# Patient Record
Sex: MALE | Race: WHITE | HISPANIC OR LATINO
[De-identification: names, ages, dates, MRNs, and addresses within clinical notes are randomized per-mention and may not be internally consistent; named-entity substitution may affect disease eponyms.]

---
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
## 2018-02-07 ENCOUNTER — APPOINTMENT (OUTPATIENT)
Dept: OPHTHALMOLOGY | Facility: CLINIC | Age: 18
End: 2018-02-07

## 2021-02-12 ENCOUNTER — EMERGENCY (EMERGENCY)
Facility: HOSPITAL | Age: 21
LOS: 1 days | Discharge: HOME | End: 2021-02-12
Attending: EMERGENCY MEDICINE
Payer: MEDICAID

## 2021-02-12 VITALS
HEART RATE: 82 BPM | SYSTOLIC BLOOD PRESSURE: 127 MMHG | RESPIRATION RATE: 16 BRPM | DIASTOLIC BLOOD PRESSURE: 67 MMHG | TEMPERATURE: 98 F | OXYGEN SATURATION: 98 % | HEIGHT: 67 IN | WEIGHT: 145.06 LBS

## 2021-02-12 PROCEDURE — 73130 X-RAY EXAM OF HAND: CPT

## 2021-02-12 PROCEDURE — 12002 RPR S/N/AX/GEN/TRNK2.6-7.5CM: CPT

## 2021-02-12 PROCEDURE — 99283 EMERGENCY DEPT VISIT LOW MDM: CPT | Mod: 25

## 2021-02-12 PROCEDURE — 73130 X-RAY EXAM OF HAND: CPT | Mod: 26,LT

## 2021-02-12 NOTE — ED PROVIDER NOTE - PATIENT PORTAL LINK FT
You can access the FollowMyHealth Patient Portal offered by [facility] by registering at the following website: [email or web address]. By joining [employer]’s FollowMyHealth portal, you will also be able to view your health information using other applications (apps) compatible with our system.

## 2021-02-12 NOTE — ED PROVIDER NOTE - ATTENDING CONTRIBUTION TO CARE
no retained FB on xray or exam, pt NV intact, no obvious tendon damage. lac repaired, safe for d/c with supportive care, wound care instructions.

## 2021-02-12 NOTE — ED ADULT NURSE NOTE - OBJECTIVE STATEMENT
Pt is a AAOx3, 20y male c/o left hand laceration. Pt states "I was cleaning a wine glass and it broke and cut my hand". Pt is a AAOx3, 20y male c/o left hand laceration on palm. Pt states "I was cleaning a wine glass and it broke and cut my hand". No active bleeding noted. Dry blood around site. Pt able to move all digits without difficulty. No c/o numbness or tingling. Denies discomfort at this time. Denies cp, sob, n/v, diarrhea, weakness or fevers.

## 2021-02-12 NOTE — ED PROVIDER NOTE - PHYSICAL EXAMINATION
Physical Examination: Gen: NAD, non-toxic, conversational  HENT: Normocephalic, atraumatic. External ears normal, no rhinorrhea, moist mucous membranes.   Resp: non-labored, speaking without difficulty on room air  Abd: soft, non-tender, non rigid, no guarding or rebound tenderness  Back: No CVAT bilaterally, no midline ttp  Skin: . +1cm L thenar lac and 1cm dorsal base L thumb lac  Neuro: AOx3, speech is fluent and appropriate, HUDSON without laterality, stable gait   Psych: Mood okay, affect euthymic

## 2021-02-12 NOTE — ED PROVIDER NOTE - NSFOLLOWUPINSTRUCTIONS_ED_ALL_ED_FT
1. Keep the wound clean and dry for 24 hours then gently rinse the wound daily without scrubbing.  Apply neosporin or bacitracin 1-2 times daily.  Change the dressing daily.  2. Return to the ED with new or worsening symptoms including fever, pus, redness, or uncontrolled pain.  3. Please return to Emergency Room, Urgent Care or your PMD in 5-7 days to REMOVE SUTURES.    After having your stitches or staples removed it is typical to have minor discomfort, swelling, or redness in the area. The wound is still healing so continue to protect it from injury. Keep the wound dry and if given creams, ointments, or medication, take as instructed to by your health care professional.    SEEK IMMEDIATE MEDICAL CARE IF YOU HAVE ANY OF THE FOLLOWING SYMPTOMS: increasing redness/swelling/pain in the wound, pus coming from the wound, bad smell coming from the wound, or fever.

## 2021-02-12 NOTE — ED PROVIDER NOTE - OBJECTIVE STATEMENT
20M here for glass breaking and lacerating the base of the L thumb with minimal active bleeding. last tet ~4 years. does not think any piece broke off and retained into skin.

## 2021-07-06 ENCOUNTER — EMERGENCY (EMERGENCY)
Facility: HOSPITAL | Age: 21
LOS: 1 days | Discharge: HOME | End: 2021-07-06
Attending: EMERGENCY MEDICINE | Admitting: EMERGENCY MEDICINE
Payer: MEDICAID

## 2021-07-06 VITALS
DIASTOLIC BLOOD PRESSURE: 80 MMHG | TEMPERATURE: 100 F | WEIGHT: 149.91 LBS | OXYGEN SATURATION: 96 % | RESPIRATION RATE: 16 BRPM | HEART RATE: 108 BPM | SYSTOLIC BLOOD PRESSURE: 129 MMHG | HEIGHT: 67 IN

## 2021-07-06 DIAGNOSIS — J36 PERITONSILLAR ABSCESS: ICD-10-CM

## 2021-07-06 LAB
ALBUMIN SERPL ELPH-MCNC: 3.9 G/DL — SIGNIFICANT CHANGE UP (ref 3.3–5)
ALP SERPL-CCNC: 110 U/L — SIGNIFICANT CHANGE UP (ref 40–120)
ALT FLD-CCNC: 21 U/L — SIGNIFICANT CHANGE UP (ref 10–45)
ANION GAP SERPL CALC-SCNC: 6 MMOL/L — SIGNIFICANT CHANGE UP (ref 5–17)
AST SERPL-CCNC: 22 U/L — SIGNIFICANT CHANGE UP (ref 10–40)
BASOPHILS # BLD AUTO: 0.04 K/UL — SIGNIFICANT CHANGE UP (ref 0–0.2)
BASOPHILS NFR BLD AUTO: 0.3 % — SIGNIFICANT CHANGE UP (ref 0–2)
BILIRUB SERPL-MCNC: 1.8 MG/DL — HIGH (ref 0.2–1.2)
BUN SERPL-MCNC: 11 MG/DL — SIGNIFICANT CHANGE UP (ref 7–23)
CALCIUM SERPL-MCNC: 9.1 MG/DL — SIGNIFICANT CHANGE UP (ref 8.4–10.5)
CHLORIDE SERPL-SCNC: 100 MMOL/L — SIGNIFICANT CHANGE UP (ref 96–108)
CO2 SERPL-SCNC: 30 MMOL/L — SIGNIFICANT CHANGE UP (ref 22–31)
CREAT SERPL-MCNC: 1.26 MG/DL — SIGNIFICANT CHANGE UP (ref 0.5–1.3)
EOSINOPHIL # BLD AUTO: 0.37 K/UL — SIGNIFICANT CHANGE UP (ref 0–0.5)
EOSINOPHIL NFR BLD AUTO: 2.8 % — SIGNIFICANT CHANGE UP (ref 0–6)
GLUCOSE SERPL-MCNC: 119 MG/DL — HIGH (ref 70–99)
HCT VFR BLD CALC: 45.6 % — SIGNIFICANT CHANGE UP (ref 39–50)
HGB BLD-MCNC: 16.1 G/DL — SIGNIFICANT CHANGE UP (ref 13–17)
IMM GRANULOCYTES NFR BLD AUTO: 0.2 % — SIGNIFICANT CHANGE UP (ref 0–1.5)
LYMPHOCYTES # BLD AUTO: 15 % — SIGNIFICANT CHANGE UP (ref 13–44)
LYMPHOCYTES # BLD AUTO: 2 K/UL — SIGNIFICANT CHANGE UP (ref 1–3.3)
MCHC RBC-ENTMCNC: 30.8 PG — SIGNIFICANT CHANGE UP (ref 27–34)
MCHC RBC-ENTMCNC: 35.3 GM/DL — SIGNIFICANT CHANGE UP (ref 32–36)
MCV RBC AUTO: 87.4 FL — SIGNIFICANT CHANGE UP (ref 80–100)
MONOCYTES # BLD AUTO: 1.22 K/UL — HIGH (ref 0–0.9)
MONOCYTES NFR BLD AUTO: 9.2 % — SIGNIFICANT CHANGE UP (ref 2–14)
NEUTROPHILS # BLD AUTO: 9.67 K/UL — HIGH (ref 1.8–7.4)
NEUTROPHILS NFR BLD AUTO: 72.5 % — SIGNIFICANT CHANGE UP (ref 43–77)
NRBC # BLD: 0 /100 WBCS — SIGNIFICANT CHANGE UP (ref 0–0)
PLATELET # BLD AUTO: 229 K/UL — SIGNIFICANT CHANGE UP (ref 150–400)
POTASSIUM SERPL-MCNC: 4.1 MMOL/L — SIGNIFICANT CHANGE UP (ref 3.5–5.3)
POTASSIUM SERPL-SCNC: 4.1 MMOL/L — SIGNIFICANT CHANGE UP (ref 3.5–5.3)
PROT SERPL-MCNC: 7.6 G/DL — SIGNIFICANT CHANGE UP (ref 6–8.3)
RBC # BLD: 5.22 M/UL — SIGNIFICANT CHANGE UP (ref 4.2–5.8)
RBC # FLD: 12.5 % — SIGNIFICANT CHANGE UP (ref 10.3–14.5)
SARS-COV-2 RNA SPEC QL NAA+PROBE: SIGNIFICANT CHANGE UP
SODIUM SERPL-SCNC: 136 MMOL/L — SIGNIFICANT CHANGE UP (ref 135–145)
WBC # BLD: 13.33 K/UL — HIGH (ref 3.8–10.5)
WBC # FLD AUTO: 13.33 K/UL — HIGH (ref 3.8–10.5)

## 2021-07-06 PROCEDURE — 96374 THER/PROPH/DIAG INJ IV PUSH: CPT

## 2021-07-06 PROCEDURE — 31575 DIAGNOSTIC LARYNGOSCOPY: CPT

## 2021-07-06 PROCEDURE — 99285 EMERGENCY DEPT VISIT HI MDM: CPT

## 2021-07-06 PROCEDURE — 70491 CT SOFT TISSUE NECK W/DYE: CPT | Mod: MA

## 2021-07-06 PROCEDURE — 99283 EMERGENCY DEPT VISIT LOW MDM: CPT | Mod: 25

## 2021-07-06 PROCEDURE — 87635 SARS-COV-2 COVID-19 AMP PRB: CPT

## 2021-07-06 PROCEDURE — 85025 COMPLETE CBC W/AUTO DIFF WBC: CPT

## 2021-07-06 PROCEDURE — 42700 I&D ABSCESS PERITONSILLAR: CPT

## 2021-07-06 PROCEDURE — 80053 COMPREHEN METABOLIC PANEL: CPT

## 2021-07-06 PROCEDURE — 99284 EMERGENCY DEPT VISIT MOD MDM: CPT | Mod: 25

## 2021-07-06 PROCEDURE — 36415 COLL VENOUS BLD VENIPUNCTURE: CPT

## 2021-07-06 PROCEDURE — 96376 TX/PRO/DX INJ SAME DRUG ADON: CPT

## 2021-07-06 PROCEDURE — 10160 PNXR ASPIR ABSC HMTMA BULLA: CPT

## 2021-07-06 PROCEDURE — 96375 TX/PRO/DX INJ NEW DRUG ADDON: CPT

## 2021-07-06 PROCEDURE — 71045 X-RAY EXAM CHEST 1 VIEW: CPT | Mod: 26

## 2021-07-06 PROCEDURE — 70491 CT SOFT TISSUE NECK W/DYE: CPT | Mod: 26,MA

## 2021-07-06 PROCEDURE — 71045 X-RAY EXAM CHEST 1 VIEW: CPT

## 2021-07-06 RX ORDER — DEXAMETHASONE 0.5 MG/5ML
6 ELIXIR ORAL ONCE
Refills: 0 | Status: COMPLETED | OUTPATIENT
Start: 2021-07-06 | End: 2021-07-06

## 2021-07-06 RX ORDER — ACETAMINOPHEN 500 MG
650 TABLET ORAL ONCE
Refills: 0 | Status: COMPLETED | OUTPATIENT
Start: 2021-07-06 | End: 2021-07-06

## 2021-07-06 RX ORDER — SODIUM CHLORIDE 9 MG/ML
1000 INJECTION INTRAMUSCULAR; INTRAVENOUS; SUBCUTANEOUS ONCE
Refills: 0 | Status: COMPLETED | OUTPATIENT
Start: 2021-07-06 | End: 2021-07-06

## 2021-07-06 RX ORDER — KETOROLAC TROMETHAMINE 30 MG/ML
30 SYRINGE (ML) INJECTION ONCE
Refills: 0 | Status: DISCONTINUED | OUTPATIENT
Start: 2021-07-06 | End: 2021-07-06

## 2021-07-06 RX ORDER — DEXAMETHASONE 0.5 MG/5ML
4 ELIXIR ORAL ONCE
Refills: 0 | Status: COMPLETED | OUTPATIENT
Start: 2021-07-06 | End: 2021-07-06

## 2021-07-06 RX ADMIN — Medication 30 MILLIGRAM(S): at 11:22

## 2021-07-06 RX ADMIN — Medication 100 MILLIGRAM(S): at 08:38

## 2021-07-06 RX ADMIN — Medication 6 MILLIGRAM(S): at 08:38

## 2021-07-06 RX ADMIN — Medication 4 MILLIGRAM(S): at 11:23

## 2021-07-06 RX ADMIN — SODIUM CHLORIDE 1000 MILLILITER(S): 9 INJECTION INTRAMUSCULAR; INTRAVENOUS; SUBCUTANEOUS at 08:38

## 2021-07-06 RX ADMIN — Medication 650 MILLIGRAM(S): at 09:08

## 2021-07-06 RX ADMIN — Medication 650 MILLIGRAM(S): at 08:38

## 2021-07-06 NOTE — ED PROVIDER NOTE - CARE PROVIDER_API CALL
Andre Wang (MD)  Otolaryngology  [address], [address]st [place] [address]  Phone: [phone]  Fax: [phone]  Follow Up Time:

## 2021-07-06 NOTE — ED PROVIDER NOTE - NSFOLLOWUPINSTRUCTIONS_ED_ALL_ED_FT
Peritonsillar Abscess    Take Ibuprofen 600mg every 6 hours as needed for pain.   Take Tylenol 650mg every 6 hours as needed for fever.    WHAT YOU NEED TO KNOW:    A peritonsillar abscess, or PTA, is a collection of pus in the peritonsillar space. The peritonsillar space is the area between your tonsil and the back wall of your throat. It is near the opening of the tubes leading to your stomach and lungs.     DISCHARGE INSTRUCTIONS:    Call 911 if:   •You have trouble breathing.          Return to the emergency department if:   •You have more pain, swelling, or redness in your throat.      •Your symptoms get worse or do not get better, even with treatment.      •You have difficulty or pain when you swallow, or you cannot eat or drink.      Contact your healthcare provider if:   •Your abscess returns.      •You have questions or concerns about your condition or care.      Medicines:   •Antibiotics help treat or prevent a bacterial infection.       •Acetaminophen decreases pain and fever. It is available without a doctor's order. Ask how much to take and how often to take it. Follow directions. Acetaminophen can cause liver damage if not taken correctly.      •Steroids decrease swelling.       •NSAIDs, such as ibuprofen, help decrease swelling, pain, and fever. This medicine is available with or without a doctor's order. NSAIDs can cause stomach bleeding or kidney problems in certain people. If you take blood thinner medicine, always ask if NSAIDs are safe for you. Always read the medicine label and follow directions. Do not give these medicines to children under 6 months of age without direction from your child's healthcare provider.      •Take your medicine as directed. Contact your healthcare provider if you think your medicine is not helping or if you have side effects. Tell him or her if you are allergic to any medicine. Keep a list of the medicines, vitamins, and herbs you take. Include the amounts, and when and why you take them. Bring the list or the pill bottles to follow-up visits. Carry your medicine list with you in case of an emergency.      Decrease your risk for a peritonsillar abscess:   •Care for your mouth and teeth. Brush and floss your teeth after you eat, and before you go to sleep. Gently brush your teeth and gums using a brush with soft bristles. Use a mouth rinse after you brush. See your dentist for regular check-ups.      •Do not delay treatment for a sore throat. Make an appointment to see your doctor if you have a sore throat that continues for more than a few days. If you have a fever with a sore throat, call your doctor that day. Early treatment may prevent a peritonsillar abscess. Take your antibiotic for throat infections until it is done.       •Do not smoke. Nicotine and other chemicals in cigarettes and cigars may increase your risk for a peritonsillar abscess. Ask your healthcare provider for information if you currently smoke and need help to quit. E-cigarettes or smokeless tobacco still contain nicotine. Talk to your healthcare provider before you use these products.       Manage your symptoms:   •A liquid diet may decrease your discomfort until the PTA is healed. A liquid diet may include jello, juices, or ice pops.           Follow up with your healthcare provider as directed: Write down your questions so you can remember to ask them during your visits.

## 2021-07-06 NOTE — ED PROVIDER NOTE - OBJECTIVE STATEMENT
21M presents c/o sore throat x 2 days. Also c/o cough with phlegm. No fever. + change of voice. Patient states last night the pain significantly worsened and he couldn't swallow due to pain. H/o Pharyngitis in the past. No procedures needed. No recent travel. No sick contacts. No vomiting or diarrhea. +occasional cigarette smoking.

## 2021-07-06 NOTE — ED PROVIDER NOTE - CLINICAL SUMMARY MEDICAL DECISION MAKING FREE TEXT BOX
21M presents c/o sore throat x 2 days. Also c/o cough with phlegm. No fever. + change of voice. Patient states last night the pain significantly worsened and he couldn't swallow due to pain. H/o Pharyngitis in the past. No procedures needed. No recent travel. No sick contacts. No vomiting or diarrhea. +occasional cigarette smoking.   Exam as stated. Concern for PTA on right. Will CT neck with IV contrast. CXR. Labs and meds (abx and steroids). Reassess. 21M presents c/o sore throat x 2 days. Also c/o cough with phlegm. No fever. + change of voice. Patient states last night the pain significantly worsened and he couldn't swallow due to pain. H/o Pharyngitis in the past. No procedures needed. No recent travel. No sick contacts. No vomiting or diarrhea. +occasional cigarette smoking.   Exam as stated. Concern for PTA on right. Will CT neck with IV contrast. CXR. Labs and meds (abx and steroids). Reassess.    ENT came to assess and drained at bedside. [facility] Tuesday.  ENT provided their card to the patient. (Dr Wang)

## 2021-07-06 NOTE — ED PROVIDER NOTE - THROAT FINDINGS
right side > left./OROPHARYNGEAL EXUDATE/THROAT RED/UVULAR DEVIATION/HOARSE/MUFFLED VOICE/TONSILLAR SWELLING/NO DROOLING/NO TONGUE ELEVATION/NO STRIDOR

## 2021-07-06 NOTE — CONSULT NOTE ADULT - SUBJECTIVE AND OBJECTIVE BOX
Patient is a 21y old  Male who presents with a chief complaint of sore throat    HPI: Mr. Jessica is a 21 year old male who presented with having a sore throat for the past two days.  He reports that the discomfort he felt worsened in the past two days.  Reports that he has difficulty swallowing and pain radiating to his right ear and right side of his head.  Denies any fever, shortness of breath.      PAST MEDICAL & SURGICAL HISTORY:  No pertinent past medical history    No significant past surgical history        Allergies:  No Known Allergies    Social History: Denies      REVIEW OF SYSTEMS:     CONSTITUTIONAL: No weakness, fevers or chills     EYES/ENT: No visual changes;  No vertigo or throat pain      NECK: No pain or stiffness     RESPIRATORY: No cough, wheezing, hemoptysis; No shortness of breath     CARDIOVASCULAR: No chest pain or palpitations     GASTROINTESTINAL: No abdominal or epigastric pain. No nausea, vomiting, or hematemesis; No diarrhea or constipation. No melena or hematochezia.     GENITOURINARY: No dysuria, frequency or hematuria     NEUROLOGICAL: No numbness or weakness     SKIN: No itching, burning, rashes, or lesions   All other review of systems is negative unless indicated above.    Home Medications:                            16.1   13.33 )-----------( 229      ( 06 Jul 2021 08:25 )             45.6     07-06    136  |  100  |  11  ----------------------------<  119<H>  4.1   |  30  |  1.26    Ca    9.1      06 Jul 2021 08:25    TPro  7.6  /  Alb  3.9  /  TBili  1.8<H>  /  DBili  x   /  AST  22  /  ALT  21  /  AlkPhos  110  07-06    I&O's Detail    Vital Signs Last 24 Hrs  T(C): 37.5 (06 Jul 2021 08:01), Max: 37.5 (06 Jul 2021 08:01)  T(F): 99.5 (06 Jul 2021 08:01), Max: 99.5 (06 Jul 2021 08:01)  HR: 108 (06 Jul 2021 08:01) (108 - 108)  BP: 129/80 (06 Jul 2021 08:01) (129/80 - 129/80)  BP(mean): --  RR: 16 (06 Jul 2021 08:01) (16 - 16)  SpO2: 96% (06 Jul 2021 08:01) (96% - 96%)  CBC Full  -  ( 06 Jul 2021 08:25 )  WBC Count : 13.33 K/uL  RBC Count : 5.22 M/uL  Hemoglobin : 16.1 g/dL  Hematocrit : 45.6 %  Platelet Count - Automated : 229 K/uL  Mean Cell Volume : 87.4 fl  Mean Cell Hemoglobin : 30.8 pg  Mean Cell Hemoglobin Concentration : 35.3 gm/dL  Auto Neutrophil # : 9.67 K/uL  Auto Lymphocyte # : 2.00 K/uL  Auto Monocyte # : 1.22 K/uL  Auto Eosinophil # : 0.37 K/uL  Auto Basophil # : 0.04 K/uL  Auto Neutrophil % : 72.5 %  Auto Lymphocyte % : 15.0 %  Auto Monocyte % : 9.2 %  Auto Eosinophil % : 2.8 %  Auto Basophil % : 0.3 %      PHYSICAL EXAM:     Constitutional Normal: well nourished, well developed, non-dysmorphic, no acute distress     Psychiatric: age appropriate behavior, cooperative     Skin: no obvious skin lesions     Lymphatic: no cervical lymphadenopathy     ENT:        Left EAC: Normal, No cerumen, no exostoses         Right EAC: Normal, No cerumen, no exostoses           Left Tympanic Membrane: Normal, Clear, No effusion        Right Tympanic Membrane: Normal, Clear, No effusion			          External Nose:  Normal, no structural deformities		        Anterior Nasal Cavity:	Normal mucosa, no turbinate hypertrophy, straight septum     Oral Cavity:  Good dentition, tongue midline, uvula deviated to the left, +erythema        Tonsilar Size: 2+ bilaterally     Neck: No palpable lymphadenopathy     Pulmonary: No Acute Distress.      Neurologic: awake and alert      RADIOLOGY:  EXAM: CT NECK SOFT TISSUE IC    PROCEDURE DATE: 07/06/2021      INTERPRETATION: INDICATIONS: Right peritonsillar abscess.    TECHNIQUE: Axial images were obtained following the intravenous administration of contrast material. Sagittal and coronal reformatted images were obtained. 90 cc Omnipaque 350 were administered. 10 cc were discarded.    COMPARISON EXAMINATION: CT neck 1/18/2014    FINDINGS:  AERODIGESTIVE TRACT: There is a 1.9 x 1.3 cm circumscribed discrete right peritonsillar collection. There is minimal associated peripheral enhancement. There is partial effacement and stranding within the right parapharyngeal space and prominent swelling of the right oropharyngeal wall. There is no retropharyngeal collection or abscess.  LYMPH NODES: Asymmetric prominent right cervical lymph nodes which are likely reactive.  PAROTID GLANDS: Unremarkable.  SUBMANDIBULAR GLANDS: Unremarkable.  THYROID GLAND: Unremarkable.  VISUALIZED PARANASAL SINUSES: Polyp versus mucous retention cyst and moderate polypoid mucosal thickening in the left maxillary sinus.  VISUALIZED TYMPANOMASTOID CAVITIES: Unremarkable.  BONES: Unremarkable.  MISCELLANEOUS: Unremarkable.      IMPRESSION: 1.9 cm right peritonsillar phlegmon/ early abscess with surrounding local swelling and inflammation as described. There is prominence of the right cervical lymph nodes which is likely reactive.    Notification to clinician of alert:  Dr. Sigala was notified about the findings at 9:38 AM on 7/6/2021 with readback confirmation. The opportunity for questions was provided and all questions asked were answered.    --- End of Report ---      NAOMI HOLGUIN MD; Attending Radiologist  This document has been electronically signed. Jul 6 2021 9:42AM    PROCEDURE:  Peritonsillar aspiration with Dr. Wang.    With a tuberculin syringe, injected 1cc of Lidocaine with Epinephine [id number] in the right peritonsillar region.  After a few minutes, attempted to aspirate collection with a 5cc syringe and 18 guage needle.  Minimal to no purulence aspirated after 3 attempts.  Patient tolerated procedure well and hemostasis was verified.     Patient is a 21y old  Male who presents with a chief complaint of sore throat    HPI: Mr. Jessica is a 21 year old male who presented with having a sore throat for the past two days.  He reports that the discomfort he felt worsened in the past two days.  Reports that he has difficulty swallowing and pain radiating to his right ear and right side of his head.  Denies any fever, shortness of breath.      PAST MEDICAL & SURGICAL HISTORY:  No pertinent past medical history    No significant past surgical history        Allergies:  No Known Allergies    Social History: Denies      REVIEW OF SYSTEMS:     CONSTITUTIONAL: No weakness, fevers or chills     EYES/ENT: No visual changes;  No vertigo or throat pain      NECK: No pain or stiffness     RESPIRATORY: No cough, wheezing, hemoptysis; No shortness of breath     CARDIOVASCULAR: No chest pain or palpitations     GASTROINTESTINAL: No abdominal or epigastric pain. No nausea, vomiting, or hematemesis; No diarrhea or constipation. No melena or hematochezia.     GENITOURINARY: No dysuria, frequency or hematuria     NEUROLOGICAL: No numbness or weakness     SKIN: No itching, burning, rashes, or lesions   All other review of systems is negative unless indicated above.    Home Medications:                            16.1   13.33 )-----------( 229      ( 06 Jul 2021 08:25 )             45.6     07-06    136  |  100  |  11  ----------------------------<  119<H>  4.1   |  30  |  1.26    Ca    9.1      06 Jul 2021 08:25    TPro  7.6  /  Alb  3.9  /  TBili  1.8<H>  /  DBili  x   /  AST  22  /  ALT  21  /  AlkPhos  110  07-06    I&O's Detail    Vital Signs Last 24 Hrs  T(C): 37.5 (06 Jul 2021 08:01), Max: 37.5 (06 Jul 2021 08:01)  T(F): 99.5 (06 Jul 2021 08:01), Max: 99.5 (06 Jul 2021 08:01)  HR: 108 (06 Jul 2021 08:01) (108 - 108)  BP: 129/80 (06 Jul 2021 08:01) (129/80 - 129/80)  BP(mean): --  RR: 16 (06 Jul 2021 08:01) (16 - 16)  SpO2: 96% (06 Jul 2021 08:01) (96% - 96%)  CBC Full  -  ( 06 Jul 2021 08:25 )  WBC Count : 13.33 K/uL  RBC Count : 5.22 M/uL  Hemoglobin : 16.1 g/dL  Hematocrit : 45.6 %  Platelet Count - Automated : 229 K/uL  Mean Cell Volume : 87.4 fl  Mean Cell Hemoglobin : 30.8 pg  Mean Cell Hemoglobin Concentration : 35.3 gm/dL  Auto Neutrophil # : 9.67 K/uL  Auto Lymphocyte # : 2.00 K/uL  Auto Monocyte # : 1.22 K/uL  Auto Eosinophil # : 0.37 K/uL  Auto Basophil # : 0.04 K/uL  Auto Neutrophil % : 72.5 %  Auto Lymphocyte % : 15.0 %  Auto Monocyte % : 9.2 %  Auto Eosinophil % : 2.8 %  Auto Basophil % : 0.3 %      PHYSICAL EXAM:     Constitutional Normal: well nourished, well developed, non-dysmorphic, no acute distress     Psychiatric: age appropriate behavior, cooperative     Skin: no obvious skin lesions     Lymphatic: no cervical lymphadenopathy     ENT:        Left EAC: Normal, No cerumen, no exostoses         Right EAC: Normal, No cerumen, no exostoses           Left Tympanic Membrane: Normal, Clear, No effusion        Right Tympanic Membrane: Normal, Clear, No effusion			          External Nose:  Normal, no structural deformities		        Anterior Nasal Cavity:	Normal mucosa, no turbinate hypertrophy, straight septum     Oral Cavity:  Good dentition, tongue midline, uvula deviated to the left, +erythema        Tonsilar Size: 2+ bilaterally     Neck: No palpable lymphadenopathy     Pulmonary: No Acute Distress.      Neurologic: awake and alert      RADIOLOGY:  EXAM: CT NECK SOFT TISSUE IC    PROCEDURE DATE: 07/06/2021      INTERPRETATION: INDICATIONS: Right peritonsillar abscess.    TECHNIQUE: Axial images were obtained following the intravenous administration of contrast material. Sagittal and coronal reformatted images were obtained. 90 cc Omnipaque 350 were administered. 10 cc were discarded.    COMPARISON EXAMINATION: CT neck 1/18/2014    FINDINGS:  AERODIGESTIVE TRACT: There is a 1.9 x 1.3 cm circumscribed discrete right peritonsillar collection. There is minimal associated peripheral enhancement. There is partial effacement and stranding within the right parapharyngeal space and prominent swelling of the right oropharyngeal wall. There is no retropharyngeal collection or abscess.  LYMPH NODES: Asymmetric prominent right cervical lymph nodes which are likely reactive.  PAROTID GLANDS: Unremarkable.  SUBMANDIBULAR GLANDS: Unremarkable.  THYROID GLAND: Unremarkable.  VISUALIZED PARANASAL SINUSES: Polyp versus mucous retention cyst and moderate polypoid mucosal thickening in the left maxillary sinus.  VISUALIZED TYMPANOMASTOID CAVITIES: Unremarkable.  BONES: Unremarkable.  MISCELLANEOUS: Unremarkable.      IMPRESSION: 1.9 cm right peritonsillar phlegmon/ early abscess with surrounding local swelling and inflammation as described. There is prominence of the right cervical lymph nodes which is likely reactive.    Notification to clinician of alert:  Dr. Sigala was notified about the findings at 9:38 AM on 7/6/2021 with readback confirmation. The opportunity for questions was provided and all questions asked were answered.    --- End of Report ---      NAOMI HOLGUIN MD; Attending Radiologist  This document has been electronically signed. Jul 6 2021 9:42AM    PROCEDURE:  Peritonsillar aspiration with Dr. Wang.    With a tuberculin syringe, injected 1cc of Lidocaine with Epinephine [id number] in the right peritonsillar region.  After a few minutes, attempted to aspirate collection with a 5cc syringe and 18 guage needle.  Minimal to no purulence aspirated after 3 attempts.  Patient tolerated procedure well and hemostasis was verified.    LARYNGOSCOPY EXAM (Scope #[id number]):      -Verbal consent was obtained from patient prior to procedure.       Indication: 1 hour post PTA aspiration         Flexible laryngoscopy was performed and revealed the following:       -- Nasopharynx had no mass or exudate.       -- Base of tongue was symmetric and not enlarged.       -- Vallecula was clear       -- Epiglottis, both aryepiglottic folds and both false vocal folds were normal       -- Arytenoids both without edema and erythema        -- True vocal folds were fully mobile and without lesions.        -- Post cricoid area was clear.       -- Interarytenoid edema was absent       The patient tolerated the procedure well.

## 2021-07-06 NOTE — CONSULT NOTE ADULT - PROBLEM SELECTOR RECOMMENDATION 9
- Start Medrol Dose Harjeet  - Start Clindamycin 450mg TID x 10 days  - If symptoms worsen, call ENT at [phone] or go to the ER  - Soft diet / liquids  - Follow up with Dr. Wang on Tuesday next week.

## 2021-07-06 NOTE — ED PROVIDER NOTE - PATIENT PORTAL LINK FT
You can access the FollowMyHealth Patient Portal offered by [facility] by registering at the following website: [email or web address]. By joining [employer]’s FollowMyHealth portal, you will also be able to view your health information using other applications (apps) compatible with our system.

## 2021-07-06 NOTE — ED ADULT NURSE NOTE - OBJECTIVE STATEMENT
Patient presents to ED with complaints of sore throat x 2 days with productive cough. Patient states yesterday pain got much worse and he was having difficulty swallowing. Denies fevers, chills, vomiting, diarrhea, or other complaints at this time. Patient presents to ED with complaints of sore throat x 2 days with productive cough. Patient states pain is localized to right side. Yesterday pain got much worse and he was having difficulty swallowing. Denies fevers, chills, vomiting, diarrhea, or other complaints at this time.

## 2021-09-20 ENCOUNTER — EMERGENCY (EMERGENCY)
Facility: HOSPITAL | Age: 21
LOS: 1 days | Discharge: HOME | End: 2021-09-20
Attending: EMERGENCY MEDICINE | Admitting: EMERGENCY MEDICINE
Payer: MEDICAID

## 2021-09-20 VITALS
SYSTOLIC BLOOD PRESSURE: 125 MMHG | WEIGHT: 160.06 LBS | RESPIRATION RATE: 16 BRPM | DIASTOLIC BLOOD PRESSURE: 94 MMHG | OXYGEN SATURATION: 96 % | HEIGHT: 67 IN | TEMPERATURE: 98 F | HEART RATE: 95 BPM

## 2021-09-20 VITALS — RESPIRATION RATE: 12 BRPM | HEART RATE: 88 BPM | OXYGEN SATURATION: 100 %

## 2021-09-20 LAB
ALBUMIN SERPL ELPH-MCNC: 4.4 G/DL — SIGNIFICANT CHANGE UP (ref 3.3–5)
ALP SERPL-CCNC: 104 U/L — SIGNIFICANT CHANGE UP (ref 40–120)
ALT FLD-CCNC: 26 U/L — SIGNIFICANT CHANGE UP (ref 10–45)
AMPHET UR-MCNC: NEGATIVE — SIGNIFICANT CHANGE UP
ANION GAP SERPL CALC-SCNC: 10 MMOL/L — SIGNIFICANT CHANGE UP (ref 5–17)
APAP SERPL-MCNC: <1 UG/ML — LOW (ref 10–30)
APPEARANCE UR: CLEAR — SIGNIFICANT CHANGE UP
AST SERPL-CCNC: 23 U/L — SIGNIFICANT CHANGE UP (ref 10–40)
BARBITURATES UR SCN-MCNC: NEGATIVE — SIGNIFICANT CHANGE UP
BASOPHILS # BLD AUTO: 0.04 K/UL — SIGNIFICANT CHANGE UP (ref 0–0.2)
BASOPHILS NFR BLD AUTO: 0.3 % — SIGNIFICANT CHANGE UP (ref 0–2)
BENZODIAZ UR-MCNC: NEGATIVE — SIGNIFICANT CHANGE UP
BILIRUB SERPL-MCNC: 1.7 MG/DL — HIGH (ref 0.2–1.2)
BILIRUB UR-MCNC: NEGATIVE — SIGNIFICANT CHANGE UP
BUN SERPL-MCNC: 15 MG/DL — SIGNIFICANT CHANGE UP (ref 7–23)
CALCIUM SERPL-MCNC: 9 MG/DL — SIGNIFICANT CHANGE UP (ref 8.4–10.5)
CHLORIDE SERPL-SCNC: 100 MMOL/L — SIGNIFICANT CHANGE UP (ref 96–108)
CO2 SERPL-SCNC: 25 MMOL/L — SIGNIFICANT CHANGE UP (ref 22–31)
COCAINE METAB.OTHER UR-MCNC: POSITIVE
COLOR SPEC: YELLOW — SIGNIFICANT CHANGE UP
CREAT SERPL-MCNC: 1.11 MG/DL — SIGNIFICANT CHANGE UP (ref 0.5–1.3)
DIFF PNL FLD: NEGATIVE — SIGNIFICANT CHANGE UP
EOSINOPHIL # BLD AUTO: 0.09 K/UL — SIGNIFICANT CHANGE UP (ref 0–0.5)
EOSINOPHIL NFR BLD AUTO: 0.8 % — SIGNIFICANT CHANGE UP (ref 0–6)
ETHANOL SERPL-MCNC: <3 MG/DL — SIGNIFICANT CHANGE UP (ref 0–3)
GLUCOSE SERPL-MCNC: 120 MG/DL — HIGH (ref 70–99)
GLUCOSE UR QL: NEGATIVE — SIGNIFICANT CHANGE UP
HCT VFR BLD CALC: 50.1 % — HIGH (ref 39–50)
HGB BLD-MCNC: 17.6 G/DL — HIGH (ref 13–17)
IMM GRANULOCYTES NFR BLD AUTO: 0.5 % — SIGNIFICANT CHANGE UP (ref 0–1.5)
KETONES UR-MCNC: NEGATIVE — SIGNIFICANT CHANGE UP
LEUKOCYTE ESTERASE UR-ACNC: NEGATIVE — SIGNIFICANT CHANGE UP
LYMPHOCYTES # BLD AUTO: 17.3 % — SIGNIFICANT CHANGE UP (ref 13–44)
LYMPHOCYTES # BLD AUTO: 2.01 K/UL — SIGNIFICANT CHANGE UP (ref 1–3.3)
MCHC RBC-ENTMCNC: 30.9 PG — SIGNIFICANT CHANGE UP (ref 27–34)
MCHC RBC-ENTMCNC: 35.1 GM/DL — SIGNIFICANT CHANGE UP (ref 32–36)
MCV RBC AUTO: 88 FL — SIGNIFICANT CHANGE UP (ref 80–100)
METHADONE UR-MCNC: NEGATIVE — SIGNIFICANT CHANGE UP
MONOCYTES # BLD AUTO: 0.96 K/UL — HIGH (ref 0–0.9)
MONOCYTES NFR BLD AUTO: 8.2 % — SIGNIFICANT CHANGE UP (ref 2–14)
NEUTROPHILS # BLD AUTO: 8.48 K/UL — HIGH (ref 1.8–7.4)
NEUTROPHILS NFR BLD AUTO: 72.9 % — SIGNIFICANT CHANGE UP (ref 43–77)
NITRITE UR-MCNC: NEGATIVE — SIGNIFICANT CHANGE UP
NRBC # BLD: 0 /100 WBCS — SIGNIFICANT CHANGE UP (ref 0–0)
OPIATES UR-MCNC: NEGATIVE — SIGNIFICANT CHANGE UP
PCP SPEC-MCNC: SIGNIFICANT CHANGE UP
PCP UR-MCNC: NEGATIVE — SIGNIFICANT CHANGE UP
PH UR: 6 — SIGNIFICANT CHANGE UP (ref 5–8)
PLATELET # BLD AUTO: 263 K/UL — SIGNIFICANT CHANGE UP (ref 150–400)
POTASSIUM SERPL-MCNC: 4.5 MMOL/L — SIGNIFICANT CHANGE UP (ref 3.5–5.3)
POTASSIUM SERPL-SCNC: 4.5 MMOL/L — SIGNIFICANT CHANGE UP (ref 3.5–5.3)
PROT SERPL-MCNC: 8 G/DL — SIGNIFICANT CHANGE UP (ref 6–8.3)
PROT UR-MCNC: NEGATIVE — SIGNIFICANT CHANGE UP
RBC # BLD: 5.69 M/UL — SIGNIFICANT CHANGE UP (ref 4.2–5.8)
RBC # FLD: 12.7 % — SIGNIFICANT CHANGE UP (ref 10.3–14.5)
SALICYLATES SERPL-MCNC: <0.2 MG/DL — LOW (ref 3–30)
SODIUM SERPL-SCNC: 135 MMOL/L — SIGNIFICANT CHANGE UP (ref 135–145)
SP GR SPEC: 1.02 — SIGNIFICANT CHANGE UP (ref 1.01–1.02)
THC UR QL: NEGATIVE — SIGNIFICANT CHANGE UP
UROBILINOGEN FLD QL: NEGATIVE — SIGNIFICANT CHANGE UP
WBC # BLD: 11.64 K/UL — HIGH (ref 3.8–10.5)
WBC # FLD AUTO: 11.64 K/UL — HIGH (ref 3.8–10.5)

## 2021-09-20 PROCEDURE — 93005 ELECTROCARDIOGRAM TRACING: CPT

## 2021-09-20 PROCEDURE — 93010 ELECTROCARDIOGRAM REPORT: CPT

## 2021-09-20 PROCEDURE — 99284 EMERGENCY DEPT VISIT MOD MDM: CPT | Mod: 25

## 2021-09-20 PROCEDURE — 80053 COMPREHEN METABOLIC PANEL: CPT

## 2021-09-20 PROCEDURE — 36415 COLL VENOUS BLD VENIPUNCTURE: CPT

## 2021-09-20 PROCEDURE — 85025 COMPLETE CBC W/AUTO DIFF WBC: CPT

## 2021-09-20 PROCEDURE — 96374 THER/PROPH/DIAG INJ IV PUSH: CPT

## 2021-09-20 PROCEDURE — 99284 EMERGENCY DEPT VISIT MOD MDM: CPT

## 2021-09-20 PROCEDURE — 80307 DRUG TEST PRSMV CHEM ANLYZR: CPT

## 2021-09-20 RX ORDER — SODIUM CHLORIDE 9 MG/ML
1000 INJECTION INTRAMUSCULAR; INTRAVENOUS; SUBCUTANEOUS ONCE
Refills: 0 | Status: COMPLETED | OUTPATIENT
Start: 2021-09-20 | End: 2021-09-20

## 2021-09-20 RX ORDER — NALOXONE HYDROCHLORIDE 4 MG/.1ML
0.04 SPRAY NASAL ONCE
Refills: 0 | Status: COMPLETED | OUTPATIENT
Start: 2021-09-20 | End: 2021-09-20

## 2021-09-20 RX ORDER — SODIUM CHLORIDE 9 MG/ML
3 INJECTION INTRAMUSCULAR; INTRAVENOUS; SUBCUTANEOUS ONCE
Refills: 0 | Status: COMPLETED | OUTPATIENT
Start: 2021-09-20 | End: 2021-09-20

## 2021-09-20 RX ADMIN — SODIUM CHLORIDE 1000 MILLILITER(S): 9 INJECTION INTRAMUSCULAR; INTRAVENOUS; SUBCUTANEOUS at 13:24

## 2021-09-20 RX ADMIN — NALOXONE HYDROCHLORIDE 0.04 MILLIGRAM(S): 4 SPRAY NASAL at 14:32

## 2021-09-20 RX ADMIN — SODIUM CHLORIDE 3 MILLILITER(S): 9 INJECTION INTRAMUSCULAR; INTRAVENOUS; SUBCUTANEOUS at 13:26

## 2021-09-20 NOTE — ED PROVIDER NOTE - PATIENT PORTAL LINK FT
You can access the FollowMyHealth Patient Portal offered by [facility] by registering at the following website: [email or web address]. By joining [employer]’s FollowMyHealth portal, you will also be able to view your health information using other applications (apps) compatible with our system.

## 2021-09-20 NOTE — ED PROVIDER NOTE - ATTENDING CONTRIBUTION TO CARE
Victor Manuel with [facility] Lee. 22 yo m pw oxycodone and possible fentanyl intoxication- pt lethargic but easily woken, oriented. labs, ivf, o2 prn, monitor  Pt awake and alert. Tolerating meals. Stable for d/c to home.  Offered SBIRT or services for rehabilitation but pt states he doesn't want the help. He doesn't want to stop. No SI/HI.     I performed a face to face bedside interview with patient regarding history of present illness, review of symptoms and past medical history. I completed an independent physical exam.  I have discussed the patient's plan of care with Physician Assistant (PA). I agree with note as stated above, having amended the EMR as needed to reflect my findings.   This includes History of Present Illness, HIV, Past Medical/Surgical/Family/Social History, Allergies and Home Medications, Review of Systems, Physical Exam, and any Progress Notes during the time I functioned as the attending physician for this patient.

## 2021-09-20 NOTE — ED PROVIDER NOTE - OBJECTIVE STATEMENT
21 year old male, PMHx of substance abuse (oxycodone, cocaine); BIBEMS for possible overdose. Patient states his mom called 911 but he doesn't know why. He states he has been taking percocets x years. Today, he states he had 1 pill but it was "fake" and he believes it contained fentanyl. Pt unsure how many milligrams. Approximates to have taken it around 11am. Denies additional drug or alcohol use today. Pt states he does not want to go to rehab.

## 2021-09-20 NOTE — ED PROVIDER NOTE - CLINICAL SUMMARY MEDICAL DECISION MAKING FREE TEXT BOX
22 yo m pw oxycodone and possible fentanyl intoxication- pt lethargic but easily woken, oriented. labs, ivf, o2 prn, monitor 22 yo m pw oxycodone and possible fentanyl intoxication- pt lethargic but easily woken, oriented. labs, ivf, o2 prn, monitor  Pt awake and alert. Tolerating meals. Stable for d/c to home.

## 2021-09-20 NOTE — ED PROVIDER NOTE - NSFOLLOWUPINSTRUCTIONS_ED_ALL_ED_FT
Rest, drink plenty of fluids  Advance activity as tolerated  Do not use drugs or alcohol  Follow up with your PMD 2-3 days- bring copies of your results  Return to the ER for worsening

## 2021-09-20 NOTE — ED ADULT NURSE NOTE - OBJECTIVE STATEMENT
Pt BIB EMS, Narcan given in the field for agonal breathing.  Pt states he took fentanyl 1 tab.  Pt is teary, withdrawn, single word answers to questions, refusing ay rehab help. Pt BIB EMS, Narcan given in the field for agonal breathing.  Pt states he took fentanyl 1 tab.  Pt is teary, withdrawn, single word answers to questions, refusing ay rehab help. Pt denies any suicidal ideations or homicidal ideations.

## 2021-09-25 LAB — DRUG SCREEN, SERUM: ABNORMAL

## 2021-10-30 ENCOUNTER — EMERGENCY (EMERGENCY)
Facility: HOSPITAL | Age: 21
LOS: 1 days | Discharge: HOME | End: 2021-10-30
Attending: INTERNAL MEDICINE | Admitting: INTERNAL MEDICINE
Payer: MEDICAID

## 2021-10-30 VITALS
TEMPERATURE: 97 F | DIASTOLIC BLOOD PRESSURE: 64 MMHG | RESPIRATION RATE: 16 BRPM | HEART RATE: 90 BPM | SYSTOLIC BLOOD PRESSURE: 150 MMHG | OXYGEN SATURATION: 96 %

## 2021-10-30 VITALS
OXYGEN SATURATION: 96 % | RESPIRATION RATE: 16 BRPM | HEART RATE: 90 BPM | WEIGHT: 160.06 LBS | DIASTOLIC BLOOD PRESSURE: 64 MMHG | SYSTOLIC BLOOD PRESSURE: 150 MMHG | TEMPERATURE: 97 F | HEIGHT: 67 IN

## 2021-10-30 PROBLEM — F19.10 OTHER PSYCHOACTIVE SUBSTANCE ABUSE, UNCOMPLICATED: Chronic | Status: ACTIVE | Noted: 2021-09-20

## 2021-10-30 PROBLEM — J45.909 UNSPECIFIED ASTHMA, UNCOMPLICATED: Chronic | Status: ACTIVE | Noted: 2021-09-20

## 2021-10-30 LAB
ALBUMIN SERPL ELPH-MCNC: 3.3 G/DL — SIGNIFICANT CHANGE UP (ref 3.3–5)
ALP SERPL-CCNC: 86 U/L — SIGNIFICANT CHANGE UP (ref 40–120)
ALT FLD-CCNC: 15 U/L — SIGNIFICANT CHANGE UP (ref 10–45)
AMPHET UR-MCNC: NEGATIVE — SIGNIFICANT CHANGE UP
ANION GAP SERPL CALC-SCNC: 9 MMOL/L — SIGNIFICANT CHANGE UP (ref 5–17)
APAP SERPL-MCNC: <1 UG/ML — LOW (ref 10–30)
AST SERPL-CCNC: 14 U/L — SIGNIFICANT CHANGE UP (ref 10–40)
BARBITURATES UR SCN-MCNC: NEGATIVE — SIGNIFICANT CHANGE UP
BASOPHILS # BLD AUTO: 0.04 K/UL — SIGNIFICANT CHANGE UP (ref 0–0.2)
BASOPHILS NFR BLD AUTO: 0.6 % — SIGNIFICANT CHANGE UP (ref 0–2)
BENZODIAZ UR-MCNC: NEGATIVE — SIGNIFICANT CHANGE UP
BILIRUB SERPL-MCNC: 0.9 MG/DL — SIGNIFICANT CHANGE UP (ref 0.2–1.2)
BUN SERPL-MCNC: 12 MG/DL — SIGNIFICANT CHANGE UP (ref 7–23)
CALCIUM SERPL-MCNC: 8.9 MG/DL — SIGNIFICANT CHANGE UP (ref 8.4–10.5)
CHLORIDE SERPL-SCNC: 105 MMOL/L — SIGNIFICANT CHANGE UP (ref 96–108)
CO2 SERPL-SCNC: 26 MMOL/L — SIGNIFICANT CHANGE UP (ref 22–31)
COCAINE METAB.OTHER UR-MCNC: POSITIVE
CREAT SERPL-MCNC: 1.01 MG/DL — SIGNIFICANT CHANGE UP (ref 0.5–1.3)
EOSINOPHIL # BLD AUTO: 0.31 K/UL — SIGNIFICANT CHANGE UP (ref 0–0.5)
EOSINOPHIL NFR BLD AUTO: 4.6 % — SIGNIFICANT CHANGE UP (ref 0–6)
GLUCOSE SERPL-MCNC: 119 MG/DL — HIGH (ref 70–99)
HCT VFR BLD CALC: 44.5 % — SIGNIFICANT CHANGE UP (ref 39–50)
HGB BLD-MCNC: 15.4 G/DL — SIGNIFICANT CHANGE UP (ref 13–17)
IMM GRANULOCYTES NFR BLD AUTO: 0.3 % — SIGNIFICANT CHANGE UP (ref 0–1.5)
LYMPHOCYTES # BLD AUTO: 1.87 K/UL — SIGNIFICANT CHANGE UP (ref 1–3.3)
LYMPHOCYTES # BLD AUTO: 27.5 % — SIGNIFICANT CHANGE UP (ref 13–44)
MAGNESIUM SERPL-MCNC: 1.8 MG/DL — SIGNIFICANT CHANGE UP (ref 1.6–2.6)
MCHC RBC-ENTMCNC: 30.3 PG — SIGNIFICANT CHANGE UP (ref 27–34)
MCHC RBC-ENTMCNC: 34.6 GM/DL — SIGNIFICANT CHANGE UP (ref 32–36)
MCV RBC AUTO: 87.6 FL — SIGNIFICANT CHANGE UP (ref 80–100)
METHADONE UR-MCNC: NEGATIVE — SIGNIFICANT CHANGE UP
MONOCYTES # BLD AUTO: 0.53 K/UL — SIGNIFICANT CHANGE UP (ref 0–0.9)
MONOCYTES NFR BLD AUTO: 7.8 % — SIGNIFICANT CHANGE UP (ref 2–14)
NEUTROPHILS # BLD AUTO: 4.03 K/UL — SIGNIFICANT CHANGE UP (ref 1.8–7.4)
NEUTROPHILS NFR BLD AUTO: 59.2 % — SIGNIFICANT CHANGE UP (ref 43–77)
NRBC # BLD: 0 /100 WBCS — SIGNIFICANT CHANGE UP (ref 0–0)
OPIATES UR-MCNC: NEGATIVE — SIGNIFICANT CHANGE UP
PCP SPEC-MCNC: SIGNIFICANT CHANGE UP
PCP UR-MCNC: NEGATIVE — SIGNIFICANT CHANGE UP
PLATELET # BLD AUTO: 255 K/UL — SIGNIFICANT CHANGE UP (ref 150–400)
POTASSIUM SERPL-MCNC: 4.1 MMOL/L — SIGNIFICANT CHANGE UP (ref 3.5–5.3)
POTASSIUM SERPL-SCNC: 4.1 MMOL/L — SIGNIFICANT CHANGE UP (ref 3.5–5.3)
PROT SERPL-MCNC: 6.6 G/DL — SIGNIFICANT CHANGE UP (ref 6–8.3)
RBC # BLD: 5.08 M/UL — SIGNIFICANT CHANGE UP (ref 4.2–5.8)
RBC # FLD: 12.1 % — SIGNIFICANT CHANGE UP (ref 10.3–14.5)
SALICYLATES SERPL-MCNC: 0.7 MG/DL — LOW (ref 3–30)
SODIUM SERPL-SCNC: 140 MMOL/L — SIGNIFICANT CHANGE UP (ref 135–145)
THC UR QL: NEGATIVE — SIGNIFICANT CHANGE UP
WBC # BLD: 6.8 K/UL — SIGNIFICANT CHANGE UP (ref 3.8–10.5)
WBC # FLD AUTO: 6.8 K/UL — SIGNIFICANT CHANGE UP (ref 3.8–10.5)

## 2021-10-30 PROCEDURE — 85025 COMPLETE CBC W/AUTO DIFF WBC: CPT

## 2021-10-30 PROCEDURE — 36415 COLL VENOUS BLD VENIPUNCTURE: CPT

## 2021-10-30 PROCEDURE — 99285 EMERGENCY DEPT VISIT HI MDM: CPT

## 2021-10-30 PROCEDURE — 80053 COMPREHEN METABOLIC PANEL: CPT

## 2021-10-30 PROCEDURE — 99283 EMERGENCY DEPT VISIT LOW MDM: CPT

## 2021-10-30 PROCEDURE — 83735 ASSAY OF MAGNESIUM: CPT

## 2021-10-30 PROCEDURE — 80307 DRUG TEST PRSMV CHEM ANLYZR: CPT

## 2021-10-30 PROCEDURE — 93005 ELECTROCARDIOGRAM TRACING: CPT

## 2021-10-30 PROCEDURE — 93010 ELECTROCARDIOGRAM REPORT: CPT

## 2021-10-30 RX ORDER — SODIUM CHLORIDE 9 MG/ML
1000 INJECTION INTRAMUSCULAR; INTRAVENOUS; SUBCUTANEOUS ONCE
Refills: 0 | Status: COMPLETED | OUTPATIENT
Start: 2021-10-30 | End: 2021-10-30

## 2021-10-30 RX ORDER — DIAZEPAM 5 MG
1 TABLET ORAL
Qty: 2 | Refills: 0
Start: 2021-10-30 | End: 2021-10-31

## 2021-10-30 RX ORDER — DIAZEPAM 5 MG
5 TABLET ORAL ONCE
Refills: 0 | Status: DISCONTINUED | OUTPATIENT
Start: 2021-10-30 | End: 2021-10-30

## 2021-10-30 RX ADMIN — SODIUM CHLORIDE 1000 MILLILITER(S): 9 INJECTION INTRAMUSCULAR; INTRAVENOUS; SUBCUTANEOUS at 11:39

## 2021-10-30 NOTE — ED PROVIDER NOTE - PHYSICAL EXAMINATION
General:     NAD   Eyes: PERRL  Head:     NC/AT, oral mucosa moist  Neck:     trachea midline  Lungs:     CTA b/l  CVS:     RRR  Abd:     +BS, s/nt/nd  Ext:   no deformities, cap refill <3   Skin: no coclor changes  Neuro: AAOx3, normal gait

## 2021-10-30 NOTE — ED PROVIDER NOTE - ATTENDING CONTRIBUTION TO CARE
pt 21 y m hx drug abuse. uses "pressed percocet daily" states that last week- 3 days after he did cocaine his hands started swelling he states "in phases" when he was in phase 3 he snorted more cocaine and felt like it instantly went away. his hands are not currently swollen and he has no complaints. pt states his friend he does drugs withs mom told him to come have his liver tests checked and an EKg  denies si, hi, hallucinations, delusions, cp, sob, n/v, numbness, tingling, weakness  unremarkable exam. labs wnl. Discussed with patient need to return to ED if symptoms don't continue to improve or recur or develops any new or worsening symptoms that are of concern.  Dr. Saenz:  I have reviewed and discussed with the PA/ resident the case specifics, including the history, physical assessment, evaluation, conclusion, laboratory results, and medical plan. I agree with the contents, and conclusions. I have personally examined, and interviewed the patient.

## 2021-10-30 NOTE — ED ADULT NURSE NOTE - PRO INTERPRETER NEED 2
Preprocedure Dx: ganglion cyst of the left 3rd finger    Postprocedure Dx: same    Procedure: Aspiration of ganglion cyst of the left 3rd finger    Surgeon: Dara Mccann MD    Indications: patient presents with a lump on the medial aspect of the left third finger at the interphalangeal joint which is soft and is consistent with ganglion cyst.  Referral to orthopedics for resection is discussed, but the patient would like aspiration today, knowing that this may return and will require formal resection. Risks and benefits are discussed and she is prepared for needle aspiration of the ganglion cyst.     Procedure: The site was prepped in usual sterile fashion and lidocaine without epi was used for local anesthetic. After this, 18 gauge needle was used to aspirate about 3 mL of thick, clear fluid consistent with ganglion cyst contents. Sterile dressing was [laced. The patient tolerated the procedure well. English

## 2021-10-30 NOTE — ED ADULT NURSE NOTE - OBJECTIVE STATEMENT
Pt presents to ED from home for bilateral hand swelling. Pt states the swelling started 1 week ago and gets worse throughout the day. He denies any injury to the area. Pt states that swelling decreases after he takes cocaine. Denies fever.

## 2021-10-30 NOTE — ED PROVIDER NOTE - NSFOLLOWUPINSTRUCTIONS_ED_ALL_ED_FT
Stop taking drugs. Call [phone] to find someone to help    Please follow-up with your doctor(s) within the next 3 days, but see medical sooner if your symptoms persist or worsen.  Please call tomorrow for an appointment.    You were given a copy of your labs and/or imaging.  Please go-over these with your doctor(s).     If you have any worsening of symptoms or any other concerns please see your doctor or return to the ED immediately.    Please continue taking your home medications as directed.  Do not use alcohol when taking any medication (especially antibiotics, tylenol or other pain medication) unless you check with the doctor or pharmacist.

## 2021-10-30 NOTE — ED ADULT TRIAGE NOTE - CHIEF COMPLAINT QUOTE
My hands were really swollen and I could barely move them. The are better after I did cocaine 2 days ago. I woke up again this morning and it is starting again.

## 2021-10-30 NOTE — ED PROVIDER NOTE - CLINICAL SUMMARY MEDICAL DECISION MAKING FREE TEXT BOX
pt 21 y m hx drug abuse. uses "pressed percocet daily" states that last week- 3 days after he did cocaine his hands started swelling he states "in phases" when he was in phase 3 he snorted more cocaine and felt like it instantly went away. his hands are not currently swollen and he has no complaints. pt states his friend he does drugs withs mom told him to come have his liver tests checked and an EKg  denies si, hi, hallucinations, delusions, cp, sob, n/v, numbness, tingling, weakness  unremarkable exam. labs wnl. Discussed with patient need to return to ED if symptoms don't continue to improve or recur or develops any new or worsening symptoms that are of concern.

## 2021-10-30 NOTE — ED PROVIDER NOTE - CARE PLAN
1 Principal Discharge DX:	Cramping of hands   Principal Discharge DX:	Cramping of hands  Secondary Diagnosis:	Active substance abuse

## 2021-10-30 NOTE — ED PROVIDER NOTE - OBJECTIVE STATEMENT
pt 21 y m hx drug abuse. uses "pressed percocet daily" states that last week- 3 days after he did cocaine his hands started swelling he states "in phases" when he was in phase 3 he snorted more cocaine and felt like it instantly went away. his hands are not currently swollen and he has no complaints. pt states his friend he does drugs withs mom told him to come have his liver tests checked and an EKg  denies si, hi, hallucinations, delusions, cp, sob, n/v, numbness, tingling, weakness

## 2021-10-30 NOTE — ED PROVIDER NOTE - PATIENT PORTAL LINK FT
You can access the FollowMyHealth Patient Portal offered by [facility] by registering at the following website: [email or web address]. By joining [employer]’s FollowMyHealth portal, you will also be able to view your health information using other applications (apps) compatible with our system.

## 2022-09-15 NOTE — ED ADULT TRIAGE NOTE - CHIEF COMPLAINT QUOTE
Reason for Disposition  • Black or tarry bowel movements (Exception: chronic-unchanged black-grey bowel movements AND is taking iron pills or Pepto-bismol)    Protocols used: RECTAL BLEEDING-A-AH    
JANICEI to PCP: Patient going to ED    Talked to the patient's wife.  Onset: Today 09/15/22   Location / description: Patient has now had 2 black, tarry, \"very\" loose stools.   Per patient's wife, he denies new pain, dizziness, vomiting, or nausea.   Precipitating Factors: Per wife \"he has a history of upper GI issues\" and \"he is always uncomfortable in his stomach area\"   Associated Symptoms: None  Symptom specific medications: Takes famotidine, Plavix, and aspirin. Did take aspirin and Plavix this AM  Recent Care: OV 6/6/22    Wife states \"he just went to the grocery store, he was outside this morning and took the garbage out.\"  Per protocol, advised wife to take patient to the ED as soon as possible. Wife agreeable.      
Patient's wife called and stated that the patient just had a black, tarry, loose stool.  Patient's wife stated that he is not in severe pain and there is no change in his diet, but he may be losing weight.      Patient's wife took a sample of the patient's stool.    
puncture wound left hand

## 2022-10-16 ENCOUNTER — EMERGENCY (EMERGENCY)
Facility: HOSPITAL | Age: 22
LOS: 1 days | Discharge: HOME | End: 2022-10-16
Attending: INTERNAL MEDICINE | Admitting: INTERNAL MEDICINE
Payer: SELF-PAY

## 2022-10-16 VITALS
HEART RATE: 70 BPM | HEIGHT: 67 IN | RESPIRATION RATE: 16 BRPM | OXYGEN SATURATION: 98 % | TEMPERATURE: 97 F | WEIGHT: 154.98 LBS | DIASTOLIC BLOOD PRESSURE: 73 MMHG | SYSTOLIC BLOOD PRESSURE: 119 MMHG

## 2022-10-16 PROCEDURE — 99283 EMERGENCY DEPT VISIT LOW MDM: CPT

## 2022-10-16 PROCEDURE — 90471 IMMUNIZATION ADMIN: CPT

## 2022-10-16 PROCEDURE — 90715 TDAP VACCINE 7 YRS/> IM: CPT

## 2022-10-16 PROCEDURE — 99283 EMERGENCY DEPT VISIT LOW MDM: CPT | Mod: 25

## 2022-10-16 PROCEDURE — 12001 RPR S/N/AX/GEN/TRNK 2.5CM/<: CPT

## 2022-10-16 RX ORDER — TETANUS TOXOID, REDUCED DIPHTHERIA TOXOID AND ACELLULAR PERTUSSIS VACCINE, ADSORBED 5; 2.5; 8; 8; 2.5 [IU]/.5ML; [IU]/.5ML; UG/.5ML; UG/.5ML; UG/.5ML
0.5 SUSPENSION INTRAMUSCULAR ONCE
Refills: 0 | Status: COMPLETED | OUTPATIENT
Start: 2022-10-16 | End: 2022-10-16

## 2022-10-16 RX ADMIN — Medication 1 TABLET(S): at 10:50

## 2022-10-16 RX ADMIN — TETANUS TOXOID, REDUCED DIPHTHERIA TOXOID AND ACELLULAR PERTUSSIS VACCINE, ADSORBED 0.5 MILLILITER(S): 5; 2.5; 8; 8; 2.5 SUSPENSION INTRAMUSCULAR at 10:50

## 2022-10-16 NOTE — ED ADULT NURSE NOTE - OBJECTIVE STATEMENT
Pt presents to ER with chief complaint of right thumb laceration. Pt states "I was messing around and hit my hand on a propane tank, the cut is pretty deep so it scared me and I need it checked out". No signs of infection noted, no bleeding, drainage or swelling noted. No s/s of distress noted, will continue to monitor, patient safety maintained. Bed placed in lowest position, non slip socks applied, call bell and bedside table within reach.

## 2022-10-16 NOTE — ED PROVIDER NOTE - OBJECTIVE STATEMENT
22 yr old male with no pmhx presents with laceration to right thumb which happened last night at 6pm  ( over 12 hours ago). right hand dominant. Unsure of last tetanus. Denies any other symptoms.

## 2022-10-16 NOTE — ED PROVIDER NOTE - CLINICAL SUMMARY MEDICAL DECISION MAKING FREE TEXT BOX
22 yr old male with no pmhx presents with laceration to right thumb which happened last night at 6pm  ( over 12 hours ago). right hand dominant. Unsure of last tetanus. Denies any other symptoms.  right finger: + 2 cm laceration noted to finger, healing, no discharge noted   due to over 12 hours, wound irrigated, steri stripes applied, and abx given   pt stable for dc and to follow up with pmd for wound check.

## 2022-10-16 NOTE — ED PROVIDER NOTE - NSFOLLOWUPINSTRUCTIONS_ED_ALL_ED_FT
Take agumentin as prescribed   you were given tetanus in ED, and your arm may be sore   Take motrin 600mg every 6 hours as needed for pain   Steri strips will fall off on their own   Return if any worsening or persistent symptoms         Sterile Tape Wound Care      Some cuts and wounds can be closed using sterile tape, also called skin adhesive strips. You can use skin adhesive strips for shallow (superficial) and simple cuts, wounds, skin tears (lacerations), and some surgical incisions. These strips are used in place of stitches (sutures), or along with sutures, to hold the edges of your wound together and to promote better healing.    Unlike sutures, adhesive strips do not require needles or anesthetic medicine for placement. The strips usually fall off on their own as your wound heals. It is important to take proper care of your wound while it heals.      Supplies needed:    •Soap and water.      •A clean, dry towel.    •If needed:  •Wound cleanser or saline solution.      •Clean gauze.      •A clean bandage (dressing) or another type of wound dressing may be used to cover or place on your wound. The wound may also be left open to air with no dressing. Follow instructions from your health care provider about what dressing supplies to use.      •Cream or ointment to apply to your wound, if told by your health care provider.          How to care for your sterile tape wound    Wound care     •Try to keep the area around your wound clean and dry. Do not allow the adhesive strips to get wet for the first 12 hours.      • Do not use any soaps or ointments on the wound for the first 12 hours.    •Ask your health care provider how to clean your wound. This may include:  •Using mild soap and water, a wound cleanser, or saline solution.      •Using a clean, dry towel or clean gauze to pat the wound dry after cleaning it. Do not rub or scrub your wound.        • Do not scratch, rub, or pick at your wound area.      • Do not take baths, swim, or use a hot tub until your health care provider approves. Ask your health care provider if you may take showers. You may only be allowed to take sponge baths.      •Protect your wound from further injury until it is healed.      •Protect your wound from sun and tanning bed exposure while it is healing, and for several weeks after healing.        Dressing care   Washing hands with soap and water. •If a dressing was put on your wound, follow instructions from your health care provider about how often to change your dressing. Make sure you:  •Wash your hands with soap and water for at least 20 seconds before and after you change your dressing. If soap and water are not available, use hand .      •Change your dressing as told by your health care provider.      •Leave adhesive strips in place. These skin closures may need to stay in place for 2 weeks or longer. If adhesive strip edges start to loosen and curl up, you may trim the loose edges. Do not remove adhesive strips completely unless your health care provider tells you to do that.        •Keep your dressing dry.      •Ask your health care provider when you can leave your wound uncovered.        Checking for infection   Two wounds with sterile tape. One is normal. The other is red with pus and infected.   Check your wound every day for signs of infection. Check for:  •Redness, swelling, or pain.      •Fluid or blood.      •New warmth, a rash, or hardness at the wound site.      •Pus or a bad smell.        Follow these instructions at home:    Medicines     •Take over-the-counter and prescription medicines only as told by your health care provider.      •If you were prescribed an antibiotic medicine, take or apply it as told by your health care provider. Do not stop using the antibiotic even if you start to feel better.      General instructions     • Do not use any products that contain nicotine or tobacco. These products include cigarettes, chewing tobacco, and vaping devices, such as e-cigarettes. If you need help quitting, ask your health care provider.      •Eat a diet that includes protein, vitamin A, and vitamin C to help the wound heal.      •Drink enough fluid to keep your urine pale yellow.      •Keep all follow-up visits. This is important.        Contact a health care provider if:    •Your adhesive strips become soaked with blood or fall off before your wound has healed. The tape will need to be replaced.      •You have a fever or chills.      •You have redness, swelling, or pain around your wound.      •You have fluid or blood coming from your wound.      •You have new warmth around your wound.      •You develop a rash after the strips are applied.      •You have a hardness around your wound.        Get help right away if:  •You have any of these signs of severe infection:  •A red streak that goes away from your wound.      •Pus or a bad smell coming from your wound.        •Your wound opens or gets deeper, longer, or wider.        Summary    •Some cuts and wounds can be closed using sterile tape, or skin adhesive strips, without the need for sutures.      •The strips usually fall off on their own as your wound is healing.      •It is important to take proper care of your wound at home while it heals and to clean it as told by your health care provider.      •To help with healing, eat foods that are rich in protein, vitamin A, and vitamin C.      This information is not intended to replace advice given to you by your health care provider. Make sure you discuss any questions you have with your health care provider.      Document Revised: 04/25/2022 Document Reviewed: 04/25/2022    Elsevier Patient Education © 2022 [employer]

## 2022-10-16 NOTE — ED PROVIDER NOTE - PATIENT PORTAL LINK FT
You can access the FollowMyHealth Patient Portal offered by [facility] by registering at the following website: [email or web address]. By joining [employer]’s FollowMyHealth portal, you will also be able to view your health information using other applications (apps) compatible with our system.

## 2022-10-16 NOTE — ED PROVIDER NOTE - ATTENDING APP SHARED VISIT CONTRIBUTION OF CARE
22 yr old male with no pmhx presents with laceration to right thumb which happened last night at 6pm  ( over 12 hours ago). right hand dominant. Unsure of last tetanus. Denies any other symptoms.  right finger: + 2 cm laceration noted to finger, healing, no discharge noted   due to over 12 hours, wound irrigated, steri stripes applied, and abx given   pt stable for dc and to follow up with pmd for wound check.  Dr. Saenz:  I have reviewed and discussed with the PA/ resident the case specifics, including the history, physical assessment, evaluation, conclusion, laboratory results, and medical plan. I agree with the contents, and conclusions. I have personally examined, and interviewed the patient.

## 2022-11-03 ENCOUNTER — EMERGENCY (EMERGENCY)
Facility: HOSPITAL | Age: 22
LOS: 1 days | Discharge: HOME | End: 2022-11-03
Attending: EMERGENCY MEDICINE | Admitting: EMERGENCY MEDICINE
Payer: MEDICAID

## 2022-11-03 VITALS
OXYGEN SATURATION: 97 % | DIASTOLIC BLOOD PRESSURE: 74 MMHG | RESPIRATION RATE: 13 BRPM | HEART RATE: 98 BPM | SYSTOLIC BLOOD PRESSURE: 117 MMHG

## 2022-11-03 VITALS
TEMPERATURE: 98 F | OXYGEN SATURATION: 97 % | RESPIRATION RATE: 18 BRPM | HEART RATE: 85 BPM | WEIGHT: 149.91 LBS | DIASTOLIC BLOOD PRESSURE: 75 MMHG | HEIGHT: 67 IN | SYSTOLIC BLOOD PRESSURE: 119 MMHG

## 2022-11-03 LAB
ALBUMIN SERPL ELPH-MCNC: 3.8 G/DL — SIGNIFICANT CHANGE UP (ref 3.3–5)
ALP SERPL-CCNC: 85 U/L — SIGNIFICANT CHANGE UP (ref 40–120)
ALT FLD-CCNC: 26 U/L — SIGNIFICANT CHANGE UP (ref 10–45)
ANION GAP SERPL CALC-SCNC: 7 MMOL/L — SIGNIFICANT CHANGE UP (ref 5–17)
APTT BLD: 29.4 SEC — SIGNIFICANT CHANGE UP (ref 27.5–35.5)
AST SERPL-CCNC: 25 U/L — SIGNIFICANT CHANGE UP (ref 10–40)
BASOPHILS # BLD AUTO: 0.04 K/UL — SIGNIFICANT CHANGE UP (ref 0–0.2)
BASOPHILS NFR BLD AUTO: 0.3 % — SIGNIFICANT CHANGE UP (ref 0–2)
BILIRUB SERPL-MCNC: 0.7 MG/DL — SIGNIFICANT CHANGE UP (ref 0.2–1.2)
BUN SERPL-MCNC: 17 MG/DL — SIGNIFICANT CHANGE UP (ref 7–23)
CALCIUM SERPL-MCNC: 8.8 MG/DL — SIGNIFICANT CHANGE UP (ref 8.4–10.5)
CHLORIDE SERPL-SCNC: 102 MMOL/L — SIGNIFICANT CHANGE UP (ref 96–108)
CO2 SERPL-SCNC: 29 MMOL/L — SIGNIFICANT CHANGE UP (ref 22–31)
CREAT SERPL-MCNC: 0.9 MG/DL — SIGNIFICANT CHANGE UP (ref 0.5–1.3)
EGFR: 123 ML/MIN/1.73M2 — SIGNIFICANT CHANGE UP
EOSINOPHIL # BLD AUTO: 0.16 K/UL — SIGNIFICANT CHANGE UP (ref 0–0.5)
EOSINOPHIL NFR BLD AUTO: 1 % — SIGNIFICANT CHANGE UP (ref 0–6)
GLUCOSE SERPL-MCNC: 92 MG/DL — SIGNIFICANT CHANGE UP (ref 70–99)
HCT VFR BLD CALC: 44.9 % — SIGNIFICANT CHANGE UP (ref 39–50)
HGB BLD-MCNC: 15.6 G/DL — SIGNIFICANT CHANGE UP (ref 13–17)
IMM GRANULOCYTES NFR BLD AUTO: 0.5 % — SIGNIFICANT CHANGE UP (ref 0–0.9)
INR BLD: 1.08 RATIO — SIGNIFICANT CHANGE UP (ref 0.88–1.16)
LYMPHOCYTES # BLD AUTO: 1.67 K/UL — SIGNIFICANT CHANGE UP (ref 1–3.3)
LYMPHOCYTES # BLD AUTO: 10.7 % — LOW (ref 13–44)
MCHC RBC-ENTMCNC: 29.8 PG — SIGNIFICANT CHANGE UP (ref 27–34)
MCHC RBC-ENTMCNC: 34.7 GM/DL — SIGNIFICANT CHANGE UP (ref 32–36)
MCV RBC AUTO: 85.9 FL — SIGNIFICANT CHANGE UP (ref 80–100)
MONOCYTES # BLD AUTO: 0.98 K/UL — HIGH (ref 0–0.9)
MONOCYTES NFR BLD AUTO: 6.3 % — SIGNIFICANT CHANGE UP (ref 2–14)
NEUTROPHILS # BLD AUTO: 12.7 K/UL — HIGH (ref 1.8–7.4)
NEUTROPHILS NFR BLD AUTO: 81.2 % — HIGH (ref 43–77)
NRBC # BLD: 0 /100 WBCS — SIGNIFICANT CHANGE UP (ref 0–0)
PLATELET # BLD AUTO: 207 K/UL — SIGNIFICANT CHANGE UP (ref 150–400)
POTASSIUM SERPL-MCNC: 3.5 MMOL/L — SIGNIFICANT CHANGE UP (ref 3.5–5.3)
POTASSIUM SERPL-SCNC: 3.5 MMOL/L — SIGNIFICANT CHANGE UP (ref 3.5–5.3)
PROT SERPL-MCNC: 7.1 G/DL — SIGNIFICANT CHANGE UP (ref 6–8.3)
PROTHROM AB SERPL-ACNC: 12.5 SEC — SIGNIFICANT CHANGE UP (ref 10.5–13.4)
RBC # BLD: 5.23 M/UL — SIGNIFICANT CHANGE UP (ref 4.2–5.8)
RBC # FLD: 12.2 % — SIGNIFICANT CHANGE UP (ref 10.3–14.5)
SODIUM SERPL-SCNC: 138 MMOL/L — SIGNIFICANT CHANGE UP (ref 135–145)
WBC # BLD: 15.63 K/UL — HIGH (ref 3.8–10.5)
WBC # FLD AUTO: 15.63 K/UL — HIGH (ref 3.8–10.5)

## 2022-11-03 PROCEDURE — 93010 ELECTROCARDIOGRAM REPORT: CPT

## 2022-11-03 PROCEDURE — 85730 THROMBOPLASTIN TIME PARTIAL: CPT

## 2022-11-03 PROCEDURE — 80053 COMPREHEN METABOLIC PANEL: CPT

## 2022-11-03 PROCEDURE — 71045 X-RAY EXAM CHEST 1 VIEW: CPT

## 2022-11-03 PROCEDURE — 76376 3D RENDER W/INTRP POSTPROCES: CPT

## 2022-11-03 PROCEDURE — 99285 EMERGENCY DEPT VISIT HI MDM: CPT | Mod: 25

## 2022-11-03 PROCEDURE — 72125 CT NECK SPINE W/O DYE: CPT | Mod: 26,MA

## 2022-11-03 PROCEDURE — 72125 CT NECK SPINE W/O DYE: CPT | Mod: MA

## 2022-11-03 PROCEDURE — 36415 COLL VENOUS BLD VENIPUNCTURE: CPT

## 2022-11-03 PROCEDURE — 96374 THER/PROPH/DIAG INJ IV PUSH: CPT

## 2022-11-03 PROCEDURE — 70486 CT MAXILLOFACIAL W/O DYE: CPT | Mod: MA

## 2022-11-03 PROCEDURE — 71045 X-RAY EXAM CHEST 1 VIEW: CPT | Mod: 26

## 2022-11-03 PROCEDURE — 70486 CT MAXILLOFACIAL W/O DYE: CPT | Mod: 26,MA

## 2022-11-03 PROCEDURE — 85610 PROTHROMBIN TIME: CPT

## 2022-11-03 PROCEDURE — 96375 TX/PRO/DX INJ NEW DRUG ADDON: CPT

## 2022-11-03 PROCEDURE — 99285 EMERGENCY DEPT VISIT HI MDM: CPT

## 2022-11-03 PROCEDURE — 70450 CT HEAD/BRAIN W/O DYE: CPT | Mod: 26,MA

## 2022-11-03 PROCEDURE — 76376 3D RENDER W/INTRP POSTPROCES: CPT | Mod: 26

## 2022-11-03 PROCEDURE — 93005 ELECTROCARDIOGRAM TRACING: CPT

## 2022-11-03 PROCEDURE — 85025 COMPLETE CBC W/AUTO DIFF WBC: CPT

## 2022-11-03 PROCEDURE — 70450 CT HEAD/BRAIN W/O DYE: CPT | Mod: MA

## 2022-11-03 RX ORDER — FLUTICASONE PROPIONATE 50 MCG
1 SPRAY, SUSPENSION NASAL
Qty: 1 | Refills: 0
Start: 2022-11-03 | End: 2022-11-09

## 2022-11-03 RX ORDER — NALOXONE HYDROCHLORIDE 4 MG/.1ML
0.4 SPRAY NASAL ONCE
Refills: 0 | Status: COMPLETED | OUTPATIENT
Start: 2022-11-03 | End: 2022-11-03

## 2022-11-03 RX ORDER — ACETAMINOPHEN 500 MG
1000 TABLET ORAL ONCE
Refills: 0 | Status: COMPLETED | OUTPATIENT
Start: 2022-11-03 | End: 2022-11-03

## 2022-11-03 RX ADMIN — Medication 1000 MILLIGRAM(S): at 16:27

## 2022-11-03 RX ADMIN — NALOXONE HYDROCHLORIDE 0.4 MILLIGRAM(S): 4 SPRAY NASAL at 17:20

## 2022-11-03 RX ADMIN — Medication 1000 MILLIGRAM(S): at 16:57

## 2022-11-03 RX ADMIN — Medication 400 MILLIGRAM(S): at 16:27

## 2022-11-03 RX ADMIN — Medication 1 TABLET(S): at 18:24

## 2022-11-03 NOTE — ED ADULT NURSE NOTE - CHPI ED NUR SYMPTOMS NEG
Click on hyperlink to view report     no back pain/no change in level of consciousness/no chest pain/no chest wall tenderness/no loss of consciousness/no seizure/no vomiting/no weakness

## 2022-11-03 NOTE — ED PROVIDER NOTE - NSFOLLOWUPINSTRUCTIONS_ED_ALL_ED_FT
Follow up with ENT within 2-3 days. Take the copy of your test results you were given in the emergency room for your doctor to review.     Please fill the prescription for the antibiotics and take as directed.  Please finish the entire course of medication as prescribed.  If you have any belly pain after the antibiotics, yogurt has been shown to help with this.  Do not use any alcohol or grapefruit juice with any antibiotics.    Use the nasal spray as directed    Return to the ER if your symptoms worsen or for any other medical emergencies  **************      Facial Fracture    WHAT YOU NEED TO KNOW:    A facial fracture is a break in one or more of the bones in your face. A facial fracture may also damage nearby tissue.   Skull         DISCHARGE INSTRUCTIONS:    Call your local emergency number (911 in the ) if:   •You suddenly feel lightheaded and short of breath.      •You have chest pain when you take a deep breath or cough.      •You cough up blood.      Return to the emergency department if:   •You suddenly have trouble chewing or swallowing.      •You have clear or pinkish fluid draining from your nose or mouth.      •You have numbness in your face.      •You have worsening pain in your eye or face.      •You have double vision or sudden trouble seeing.      •Your arm or leg feels warm, tender, and painful. It may look swollen and red.      Call your doctor if:   •You are bleeding from a wound on your face.      •You have questions or concerns about your condition or care.      Medicines: You may need any of the following:   •Decongestants help decrease swelling in your nose and sinuses. This medicine may also help you breathe easier.      •Prescription pain medicine may be given. Ask your healthcare provider how to take this medicine safely. Some prescription pain medicines contain acetaminophen. Do not take other medicines that contain acetaminophen without talking to your healthcare provider. Too much acetaminophen may cause liver damage. Prescription pain medicine may cause constipation. Ask your healthcare provider how to prevent or treat constipation.       •Steroids help decrease swelling in your face.      •Antibiotics help treat an infection caused by bacteria. This medicine may be given if you have an open wound.      •Take your medicine as directed. Contact your healthcare provider if you think your medicine is not helping or if you have side effects. Tell your provider if you are allergic to any medicine. Keep a list of the medicines, vitamins, and herbs you take. Include the amounts, and when and why you take them. Bring the list or the pill bottles to follow-up visits. Carry your medicine list with you in case of an emergency.      Nutrition: You may not be able to eat solid food for a period of time. You may first be started on a liquid diet, starting with water, broth, gelatin, apple juice, or lemon-lime soda pop. After a few days, you may be allowed to eat soft foods, such as applesauce, bananas, cooked cereal, cottage cheese, pudding, and yogurt. Ask for more information about the type of foods you can eat.    Rehabilitation: If you had surgery to fix your facial fracture, you may need oral and facial rehabilitation. This is done to restore normal use and movement of your facial muscles. Ask for more information about rehabilitation.    Self-care:   •Apply ice as directed. Ice helps decrease swelling and pain. Ice may also help prevent tissue damage. Use an ice pack, or put crushed ice in a plastic bag. Cover the bag with a towel before you place it on your skin. Apply ice for 15 to 20 minutes every hour or as directed.      •Keep your head elevated. Keep your head above the level of your heart as often as you can. This will help decrease swelling and pain. Prop your upper body on pillows or blankets to keep your head elevated comfortably.      •Do not put pressure on your face: ?Do not sleep on the injured side of your face. Pressure may cause more damage.      ?Sneeze with your mouth open to decrease pressure on your broken facial bones. Too much pressure from a sneeze may cause your broken bones to move and cause more damage.      ?Try not to blow your nose. It may cause more damage if you have a fracture near your eye. The pressure from blowing your nose may pinch the nerve of your eye and cause permanent damage.      •Clean your mouth carefully. It may be hard to clean your teeth if have an injury or fracture near your mouth. Your healthcare provider will show you the best way to do this so you do not hurt yourself. A waterpik or a child-sized soft toothbrush may work well to clean your mouth.      Follow up with your doctor as directed: Write down your questions so you remember to ask them during your visits.
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
## 2022-11-03 NOTE — ED PROVIDER NOTE - PATIENT PORTAL LINK FT
You can access the FollowMyHealth Patient Portal offered by [facility] by registering at the following website: [email or web address]. By joining [employer]’s FollowMyHealth portal, you will also be able to view your health information using other applications (apps) compatible with our system.
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
## 2022-11-03 NOTE — ED ADULT NURSE NOTE - NS ED NURSE DC INFO COMPLEXITY
Take toujeo 35 units at bed time, if continuing to have low bg in the next 2 weeks then decrease to 32 units.   If still having lows on the 32 units of the toujeo then alert us.     On the toujeo 35 units if having high bg alerts often then please let us know.    Simple: Patient demonstrates quick and easy understanding
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
## 2022-11-28 ENCOUNTER — APPOINTMENT (OUTPATIENT)
Dept: OTOLARYNGOLOGY | Facility: CLINIC | Age: 22
End: 2022-11-28
Payer: MEDICAID

## 2022-11-28 VITALS
HEIGHT: 66 IN | WEIGHT: 150 LBS | TEMPERATURE: 97.6 F | BODY MASS INDEX: 24.11 KG/M2 | HEART RATE: 62 BPM | SYSTOLIC BLOOD PRESSURE: 100 MMHG | OXYGEN SATURATION: 98 % | DIASTOLIC BLOOD PRESSURE: 80 MMHG

## 2022-11-28 DIAGNOSIS — S02.2XXA FRACTURE OF NASAL BONES, INITIAL ENCOUNTER FOR CLOSED FRACTURE: ICD-10-CM

## 2022-11-28 PROCEDURE — 31231 NASAL ENDOSCOPY DX: CPT

## 2022-11-28 PROCEDURE — 99213 OFFICE O/P EST LOW 20 MIN: CPT | Mod: 25

## 2022-11-28 RX ORDER — SALINE NASAL SPRAY 1.5 OZ
0.65 SOLUTION NASAL
Qty: 1 | Refills: 2 | Status: ACTIVE | COMMUNITY
Start: 2022-11-28 | End: 1900-01-01

## 2022-11-28 RX ORDER — FLUTICASONE PROPIONATE 50 UG/1
50 SPRAY, METERED NASAL TWICE DAILY
Qty: 1 | Refills: 6 | Status: ACTIVE | COMMUNITY
Start: 2022-11-28 | End: 1900-01-01
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
## 2022-11-29 NOTE — PHYSICAL EXAM
[Midline] : trachea located in midline position [Normal] : no rashes [de-identified] : left nasal bone depressed fracture, fracture along nasomaxillary junction, palbable step off. Both are immobile\par No septal fracture or hematoma noted

## 2022-11-29 NOTE — PROCEDURE
[FreeTextEntry1] : Nasal endoscopy [FreeTextEntry2] : Nasal fracture [FreeTextEntry3] : Procedure: nasal endoscopy \par \par Pre-operative diagnosis: \par \par Indication: Anterior rhinoscopy insufficient to diagnose pathology\par \par Details:\par After decongestant and lidocaine was sprayed in the bilateral nasal cavities, a flexible laryngoscope was inserted into the right nares. The nasal cavity, middle meatus, ETO, nasopharynx, and glottis were visualized. The endoscope was then inserted into the left nares and the nasal cavity, middle meatus, and ETO was visualized. The patient tolerated procedure well.\par \par Findings:\par no septal hematoma\par no septal fracture\par
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
## 2023-02-03 NOTE — ED PROCEDURE NOTE - LACERATION CAUSED BY
cut finger Minoxidil Counseling: Minoxidil is a topical medication which can increase blood flow where it is applied. It is uncertain how this medication increases hair growth. Side effects are uncommon and include stinging and allergic reactions.

## 2023-03-18 ENCOUNTER — EMERGENCY (EMERGENCY)
Facility: HOSPITAL | Age: 23
LOS: 1 days | Discharge: HOME | End: 2023-03-18
Attending: INTERNAL MEDICINE | Admitting: INTERNAL MEDICINE
Payer: MEDICAID

## 2023-03-18 VITALS
OXYGEN SATURATION: 98 % | RESPIRATION RATE: 18 BRPM | DIASTOLIC BLOOD PRESSURE: 63 MMHG | HEART RATE: 70 BPM | TEMPERATURE: 98 F | SYSTOLIC BLOOD PRESSURE: 98 MMHG | WEIGHT: 149.91 LBS

## 2023-03-18 PROCEDURE — 99283 EMERGENCY DEPT VISIT LOW MDM: CPT

## 2023-03-18 PROCEDURE — 51702 INSERT TEMP BLADDER CATH: CPT

## 2023-03-18 NOTE — ED PROVIDER NOTE - NSFOLLOWUPINSTRUCTIONS_ED_ALL_ED_FT
Follow up with your PMD within 1-2 days.  Rest, increase your fluids, advance your activity as tolerated.   Take all of your other medications as previously prescribed.   Worsening, continued or ANY new concerning symptoms return to the emergency department.  Continue using the Carney drink plenty of fluids recommended to either follow-up with urology or come back to the emergency room for the Carney removal and trial of voiding without the Carney

## 2023-03-18 NOTE — ED PROVIDER NOTE - PATIENT PORTAL LINK FT
You can access the FollowMyHealth Patient Portal offered by [facility] by registering at the following website: [email or web address]. By joining [employer]’s FollowMyHealth portal, you will also be able to view your health information using other applications (apps) compatible with our system.

## 2023-03-18 NOTE — ED PROVIDER NOTE - CLINICAL SUMMARY MEDICAL DECISION MAKING FREE TEXT BOX
22-year-old male with recent history of insomnia has not been able to sleep patient has been overusing over-the-counter medications doxylamine which likely led to urinary retention for the past 24 hours patient has been prescribed trazodone by the primary care doctor which has been working so patient stopped taking doxylamine  Carney catheter was ordered patient recommended to keep Carney in for 2 to 3 days and recommended to come back to the emergency room to get the Carney removed and a trial of urination without the Carney

## 2023-03-18 NOTE — ED ADULT NURSE NOTE - OBJECTIVE STATEMENT
Assumed pt care for a 22 yr old male alert an oriented x3 complaining of urinary retention. Pt reports over the last week he has been taking Unisom over he counter because he has not been able ot sleep. Since it was not working went to his doctor and was switched to Trazadone. Since then for the last two days he has not urinated well. Pt denies discharge, fever/chills. Bladder scan done approximately 320ml noted. MD aware. Carney placed, education on Carney care given by RN and information packet given to pt by Primary RN. Pt verbalized and demonstrated understanding.

## 2023-03-18 NOTE — ED PROVIDER NOTE - ATTENDING APP SHARED VISIT CONTRIBUTION OF CARE
22-year-old male with recent history of insomnia has not been able to sleep patient has been overusing over-the-counter medications doxylamine which likely led to urinary retention for the past 24 hours patient has been prescribed trazodone by the primary care doctor which has been working so patient stopped taking doxylamine  Carney catheter was ordered patient recommended to keep Carney in for 2 to 3 days and recommended to come back to the emergency room to get the Carney removed and a trial of urination without the Carney  Dr. Saenz:  I have reviewed and discussed with the PA/ resident the case specifics, including the history, physical assessment, evaluation, conclusion, laboratory results, and medical plan. I agree with the contents, and conclusions. I have personally examined, and interviewed the patient.

## 2023-03-18 NOTE — ED PROVIDER NOTE - PHYSICAL EXAMINATION
General:     NAD, well-nourished, well-appearing  Head:     NC/AT, EOMI, oral mucosa moist  Neck:     trachea midline  Lungs:     CTA b/l, no w/r/r  CVS:     S1S2, RRR, no m/g/r  Abd:     +BS, s/nt/nd, no organomegaly  Ext:    2+ radial and pedal pulses, no c/c/e  Neuro: AAOx3, no sensory/motor deficits   Distended bladder residual 240 mL with a scanner

## 2023-03-18 NOTE — ED PROVIDER NOTE - CARE PROVIDER_API CALL
Kevin Quintero  UROLOGY  [address] [place] [address], [address]  [place], [place] [id number]  Phone: [phone]  Fax: [phone]  Follow Up Time:

## 2023-03-18 NOTE — ED PROVIDER NOTE - OBJECTIVE STATEMENT
22-year-old male with recent history of insomnia has not been able to sleep patient has been overusing over-the-counter medications doxylamine which likely led to urinary retention for the past 24 hours patient has been prescribed trazodone by the primary care doctor which has been working so patient stopped taking doxylamine

## 2023-03-18 NOTE — ED PROVIDER NOTE - NS ED ATTENDING STATEMENT MOD
Attending Only This was a shared visit with the ANNA. I reviewed and verified the documentation and independently performed the documented:

## 2023-03-20 ENCOUNTER — EMERGENCY (EMERGENCY)
Facility: HOSPITAL | Age: 23
LOS: 1 days | Discharge: HOME | End: 2023-03-20
Attending: EMERGENCY MEDICINE | Admitting: EMERGENCY MEDICINE
Payer: MEDICAID

## 2023-03-20 VITALS
OXYGEN SATURATION: 99 % | HEART RATE: 82 BPM | WEIGHT: 149.91 LBS | DIASTOLIC BLOOD PRESSURE: 62 MMHG | TEMPERATURE: 98 F | RESPIRATION RATE: 19 BRPM | SYSTOLIC BLOOD PRESSURE: 119 MMHG | HEIGHT: 66 IN

## 2023-03-20 PROCEDURE — 99284 EMERGENCY DEPT VISIT MOD MDM: CPT

## 2023-03-20 PROCEDURE — 99283 EMERGENCY DEPT VISIT LOW MDM: CPT

## 2023-03-20 NOTE — ED PROVIDER NOTE - CLINICAL SUMMARY MEDICAL DECISION MAKING FREE TEXT BOX
22-year-old male with recent history of insomnia has not been able to sleep patient has been overusing over-the-counter medications doxylamine which likely led to urinary retention for the past 24 hours patient has been prescribed trazodone by the primary care doctor which has been working so patient stopped taking doxylamine. On 3/18/2023, Kaplan catheter placed during ED visit and was recommended to keep Kaplan in for 2 to 3 days to come back to the emergency room to get the Kaplan removed and a trial of urination without the Kaplan. Pt here today for removal. No complaints at this time.   Took trazodone 150mg last night. No penile pain.   Exam as stated. Will remove kaplan, encourage PO fluids and perf. void trial.

## 2023-03-20 NOTE — ED PROVIDER NOTE - OBJECTIVE STATEMENT
22-year-old male with recent history of insomnia has not been able to sleep patient has been overusing over-the-counter medications doxylamine which likely led to urinary retention for the past 24 hours patient has been prescribed trazodone by the primary care doctor which has been working so patient stopped taking doxylamine. On 3/18/2023, Carney catheter placed during ED visit and was recommended to keep Carney in for 2 to 3 days to come back to the emergency room to get the Carney removed and a trial of urination without the Carney. Pt here today for removal. No complaints at this time.   Took trazodone 150mg last night. No penile pain.

## 2023-03-20 NOTE — ED PROVIDER NOTE - PATIENT PORTAL LINK FT
You can access the FollowMyHealth Patient Portal offered by [facility] by registering at the following website: [email or web address]. By joining [employer]’s FollowMyHealth portal, you will also be able to view your health information using other applications (apps) compatible with our system.

## 2023-03-20 NOTE — ED ADULT TRIAGE NOTE - CHIEF COMPLAINT QUOTE
pt  states  he  was here on 3/18 with urinary retention and  had a kaplan cath palced  he  states  he was told  to come back to the ed  today to have kaplan removed and to see if he could void on  his  own

## 2023-03-20 NOTE — ED PROVIDER NOTE - PHYSICAL EXAMINATION
General:     NAD, well-nourished, well-appearing  Abd:     +BS, s/nt/nd; kaplan in place  Ext:   no deformities

## 2023-03-20 NOTE — ED PROVIDER NOTE - NSFOLLOWUPINSTRUCTIONS_ED_ALL_ED_FT
Take medicines as prescribed. Follow up with your primary doctor. If you are unable to urinate, return to the Emergency Department. Worsening, continued or ANY new concerning symptoms return to the emergency department.

## 2023-03-20 NOTE — ED ADULT TRIAGE NOTE - HEIGHT IN INCHES
[age]yo F Hx HTN, HLD, GERD, IBS, s/p TAVR presenting with complaints of R hip pain s/p fall. Patient reports that immediately prior to fall she believes that she felt a little lightheaded but also thinks that she "missed" a chair she was trying to sit on. No known hx of any specific arrythmia but patient has been having episodes of fatigue and lightheadedness that have been occurring on and off for some time now. Had been started on amiodarone by her cardiologist prophylactically but these episodes had still recurred a few months ago. At that time patient had 72 hour holter monitoring done as well as an echocardiogram which did not reveal any findings to explain the patient's symptoms. Patient reports that she sometimes has vague chest discomfort that she cannot clarify.   Had imaging done, revealed left superior and inferior rami fractures. No femur fracture noted. Possible sacral insufficiency fracture on the left side. No operative intervention required.   May be WBAT with a walker. PT recommended [facility]. Pt had an episode of hypotension/ vasovagal while trying to get up likely secondary to pain, now resolved\- resumed htn meds.   Pt stable for discharge to [facility] and to follow up with the Orthopedist in 3- 4 weeks

## 2024-03-05 ENCOUNTER — EMERGENCY (EMERGENCY)
Facility: HOSPITAL | Age: 24
LOS: 1 days | Discharge: HOME | End: 2024-03-05
Attending: INTERNAL MEDICINE | Admitting: INTERNAL MEDICINE
Payer: COMMERCIAL

## 2024-03-05 VITALS
WEIGHT: 134.92 LBS | HEART RATE: 75 BPM | SYSTOLIC BLOOD PRESSURE: 117 MMHG | OXYGEN SATURATION: 99 % | RESPIRATION RATE: 17 BRPM | DIASTOLIC BLOOD PRESSURE: 57 MMHG | TEMPERATURE: 97 F

## 2024-03-05 LAB
ALBUMIN SERPL ELPH-MCNC: 3.6 G/DL — SIGNIFICANT CHANGE UP (ref 3.3–5)
ALP SERPL-CCNC: 106 U/L — SIGNIFICANT CHANGE UP (ref 40–120)
ALT FLD-CCNC: 19 U/L — SIGNIFICANT CHANGE UP (ref 10–45)
AST SERPL-CCNC: 20 U/L — SIGNIFICANT CHANGE UP (ref 10–40)
BASOPHILS # BLD AUTO: 0.03 K/UL — SIGNIFICANT CHANGE UP (ref 0–0.2)
BASOPHILS NFR BLD AUTO: 0.4 % — SIGNIFICANT CHANGE UP (ref 0–2)
BILIRUB DIRECT SERPL-MCNC: 0.2 MG/DL — SIGNIFICANT CHANGE UP (ref 0–0.3)
BILIRUB INDIRECT FLD-MCNC: 0.6 MG/DL — SIGNIFICANT CHANGE UP (ref 0.2–1)
BILIRUB SERPL-MCNC: 0.8 MG/DL — SIGNIFICANT CHANGE UP (ref 0.2–1.2)
BUN SERPL-MCNC: 17 MG/DL — SIGNIFICANT CHANGE UP (ref 7–23)
CREAT SERPL-MCNC: 0.8 MG/DL — SIGNIFICANT CHANGE UP (ref 0.5–1.3)
EGFR: 126 ML/MIN/1.73M2 — SIGNIFICANT CHANGE UP
EOSINOPHIL # BLD AUTO: 0.49 K/UL — SIGNIFICANT CHANGE UP (ref 0–0.5)
EOSINOPHIL NFR BLD AUTO: 6.9 % — HIGH (ref 0–6)
HBV SURFACE AB SER-ACNC: SIGNIFICANT CHANGE UP
HBV SURFACE AG SER-ACNC: SIGNIFICANT CHANGE UP
HCT VFR BLD CALC: 45.3 % — SIGNIFICANT CHANGE UP (ref 39–50)
HCV AB S/CO SERPL IA: 0.08 S/CO — SIGNIFICANT CHANGE UP (ref 0–0.99)
HCV AB SERPL-IMP: SIGNIFICANT CHANGE UP
HCV RNA FLD QL NAA+PROBE: SIGNIFICANT CHANGE UP
HCV RNA SPEC QL PROBE+SIG AMP: SIGNIFICANT CHANGE UP
HGB BLD-MCNC: 16 G/DL — SIGNIFICANT CHANGE UP (ref 13–17)
HIV 1 & 2 AB SERPL IA.RAPID: SIGNIFICANT CHANGE UP
HIV 1+2 AB+HIV1 P24 AG SERPL QL IA: SIGNIFICANT CHANGE UP
IMM GRANULOCYTES NFR BLD AUTO: 0.3 % — SIGNIFICANT CHANGE UP (ref 0–0.9)
LYMPHOCYTES # BLD AUTO: 2.1 K/UL — SIGNIFICANT CHANGE UP (ref 1–3.3)
LYMPHOCYTES # BLD AUTO: 29.6 % — SIGNIFICANT CHANGE UP (ref 13–44)
MCHC RBC-ENTMCNC: 30.4 PG — SIGNIFICANT CHANGE UP (ref 27–34)
MCHC RBC-ENTMCNC: 35.3 GM/DL — SIGNIFICANT CHANGE UP (ref 32–36)
MCV RBC AUTO: 86.1 FL — SIGNIFICANT CHANGE UP (ref 80–100)
MONOCYTES # BLD AUTO: 0.54 K/UL — SIGNIFICANT CHANGE UP (ref 0–0.9)
MONOCYTES NFR BLD AUTO: 7.6 % — SIGNIFICANT CHANGE UP (ref 2–14)
NEUTROPHILS # BLD AUTO: 3.92 K/UL — SIGNIFICANT CHANGE UP (ref 1.8–7.4)
NEUTROPHILS NFR BLD AUTO: 55.2 % — SIGNIFICANT CHANGE UP (ref 43–77)
NRBC # BLD: 0 /100 WBCS — SIGNIFICANT CHANGE UP (ref 0–0)
PLATELET # BLD AUTO: 286 K/UL — SIGNIFICANT CHANGE UP (ref 150–400)
PROT SERPL-MCNC: 7.3 G/DL — SIGNIFICANT CHANGE UP (ref 6–8.3)
RBC # BLD: 5.26 M/UL — SIGNIFICANT CHANGE UP (ref 4.2–5.8)
RBC # FLD: 12.9 % — SIGNIFICANT CHANGE UP (ref 10.3–14.5)
WBC # BLD: 7.1 K/UL — SIGNIFICANT CHANGE UP (ref 3.8–10.5)
WBC # FLD AUTO: 7.1 K/UL — SIGNIFICANT CHANGE UP (ref 3.8–10.5)

## 2024-03-05 PROCEDURE — 86703 HIV-1/HIV-2 1 RESULT ANTBDY: CPT

## 2024-03-05 PROCEDURE — 80076 HEPATIC FUNCTION PANEL: CPT

## 2024-03-05 PROCEDURE — 87340 HEPATITIS B SURFACE AG IA: CPT

## 2024-03-05 PROCEDURE — 99284 EMERGENCY DEPT VISIT MOD MDM: CPT

## 2024-03-05 PROCEDURE — 87389 HIV-1 AG W/HIV-1&-2 AB AG IA: CPT

## 2024-03-05 PROCEDURE — 99283 EMERGENCY DEPT VISIT LOW MDM: CPT

## 2024-03-05 PROCEDURE — 85025 COMPLETE CBC W/AUTO DIFF WBC: CPT

## 2024-03-05 PROCEDURE — 82565 ASSAY OF CREATININE: CPT

## 2024-03-05 PROCEDURE — 87521 HEPATITIS C PROBE&RVRS TRNSC: CPT

## 2024-03-05 PROCEDURE — 86706 HEP B SURFACE ANTIBODY: CPT

## 2024-03-05 PROCEDURE — 36415 COLL VENOUS BLD VENIPUNCTURE: CPT

## 2024-03-05 PROCEDURE — 84520 ASSAY OF UREA NITROGEN: CPT

## 2024-03-05 PROCEDURE — 86803 HEPATITIS C AB TEST: CPT

## 2024-03-05 RX ORDER — ONDANSETRON 8 MG/1
1 TABLET, FILM COATED ORAL
Qty: 3 | Refills: 0
Start: 2024-03-05 | End: 2024-03-14

## 2024-03-05 NOTE — ED PROVIDER NOTE - PATIENT PORTAL LINK FT
You can access the FollowMyHealth Patient Portal offered by [facility] by registering at the following website: [email or web address]. By joining [employer]’s FollowMyHealth portal, you will also be able to view your health information using other applications (apps) compatible with our system.
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
## 2024-03-05 NOTE — ED ADULT NURSE NOTE - CHIEF COMPLAINT QUOTE
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 Patient reports pain/discomfort to right first finger.

## 2024-03-05 NOTE — ED ADULT NURSE NOTE - NSFALLUNIVINTERV_ED_ALL_ED
Bed/Stretcher in lowest position, wheels locked, appropriate side rails in place/Call bell, personal items and telephone in reach/Instruct patient to call for assistance before getting out of bed/chair/stretcher/Non-slip footwear applied when patient is off stretcher/[place] to call system/Physically safe environment - no spills, clutter or unnecessary equipment/Purposeful proactive rounding/Room/bathroom lighting operational, light cord in reach

## 2024-03-05 NOTE — ED ADULT TRIAGE NOTE - CHIEF COMPLAINT QUOTE
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 Patient complaints of pain/discomfort to right first finger. 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 Patient reports pain/discomfort to right first finger.
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
## 2024-03-05 NOTE — ED PROVIDER NOTE - NSFOLLOWUPINSTRUCTIONS_ED_ALL_ED_FT
Follow up with your PMD within 1-2 days.  Rest, increase your fluids, advance your activity as tolerated.   Take all of your other medications as previously prescribed.   Worsening, continued or ANY new concerning symptoms return to the emergency department.  Continue with the postexposure prophylaxis follow-up with the primary care doctor or family practice clinic within 1 week
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
## 2024-03-05 NOTE — ED PROVIDER NOTE - PHYSICAL EXAMINATION
General:     NAD, well-nourished, well-appearing  Head:     NC/AT, EOMI, oral mucosa moist  Neck:     trachea midline  Lungs:     CTA b/l, no w/r/r  CVS:     S1S2, RRR, no m/g/r  Abd:     +BS, s/nt/nd, no organomegaly  Ext:    2+ radial and pedal pulses, no c/c/e  Right thumb puncture wound  Neuro: AAOx3, no sensory/motor deficits

## 2024-05-11 ENCOUNTER — EMERGENCY (EMERGENCY)
Facility: HOSPITAL | Age: 24
LOS: 1 days | Discharge: HOME | End: 2024-05-11
Attending: STUDENT IN AN ORGANIZED HEALTH CARE EDUCATION/TRAINING PROGRAM | Admitting: STUDENT IN AN ORGANIZED HEALTH CARE EDUCATION/TRAINING PROGRAM
Payer: MEDICAID

## 2024-05-11 VITALS
HEART RATE: 96 BPM | HEIGHT: 66 IN | SYSTOLIC BLOOD PRESSURE: 112 MMHG | WEIGHT: 134.92 LBS | TEMPERATURE: 98 F | OXYGEN SATURATION: 99 % | RESPIRATION RATE: 18 BRPM | DIASTOLIC BLOOD PRESSURE: 50 MMHG

## 2024-05-11 PROCEDURE — 99284 EMERGENCY DEPT VISIT MOD MDM: CPT

## 2024-05-11 PROCEDURE — 93010 ELECTROCARDIOGRAM REPORT: CPT

## 2024-05-11 PROCEDURE — 99285 EMERGENCY DEPT VISIT HI MDM: CPT | Mod: 25

## 2024-05-11 PROCEDURE — 93005 ELECTROCARDIOGRAM TRACING: CPT

## 2024-05-11 RX ORDER — NALOXONE HYDROCHLORIDE 4 MG/.1ML
1 SPRAY NASAL
Qty: 1 | Refills: 0
Start: 2024-05-11 | End: 2024-05-11

## 2024-05-11 NOTE — ED PROVIDER NOTE - OBJECTIVE STATEMENT
23-year-old male brought in by EMS for overdose, patient was given 8 mg of Narcan by EMS, patient took Percocet and cocaine, he is currently alert and oriented x 3 and denies any acute complaints, patient is requesting discharge, denies any chest pain or shortness of breath, reports complete detox last year and just recently had a relapse, denies any other complaints.

## 2024-05-11 NOTE — ED PROVIDER NOTE - CLINICAL SUMMARY MEDICAL DECISION MAKING FREE TEXT BOX
23-year-old male brought in by EMS for overdose, patient was given 8 mg of Narcan by EMS, patient took Percocet and cocaine, he is currently alert and oriented x 3 and denies any acute complaints, patient is requesting discharge, denies any chest pain or shortness of breath, reports complete detox last year and just recently had a relapse, denies any other complaints.     Patient was reported to overdose on Percocet and cocaine, will place on end-tidal CO2 pulse extremity and cardiac monitoring, patient is declining labs and prolonged observation, is amenable to 2-hour observation in the ED, is agreeable with being discharged with Narcan kit to his pharmacy.  Return precautions discussed verbalized understanding and agreeable discharge plan.  Resources provided for outpatient detox.

## 2024-05-11 NOTE — ED PROVIDER NOTE - PATIENT PORTAL LINK FT
You can access the FollowMyHealth Patient Portal offered by [facility] by registering at the following website: [email or web address]. By joining [employer]’s FollowMyHealth portal, you will also be able to view your health information using other applications (apps) compatible with our system.

## 2024-05-11 NOTE — ED ADULT NURSE NOTE - NSFALLUNIVINTERV_ED_ALL_ED
Bed/Stretcher in lowest position, wheels locked, appropriate side rails in place/Call bell, personal items and telephone in reach/Instruct patient to call for assistance before getting out of bed/chair/stretcher/Non-slip footwear applied when patient is off stretcher/[place] to call system/Physically safe environment - no spills, clutter or unnecessary equipment/Purposeful proactive rounding/Room/bathroom lighting operational, light cord in reach

## 2024-05-11 NOTE — ED ADULT TRIAGE NOTE - CHIEF COMPLAINT QUOTE
Patient brought in by EMS for OD. Patient was given 8mg Narcan by EMS. Per EMS, patient took Percocet and coke. Patient is A&OX3 at triage.

## 2024-05-11 NOTE — ED PROVIDER NOTE - PRINCIPAL DIAGNOSIS
"Patient's FSGs are controlled on current medication regimen.  Last A1c reviewed-   Lab Results   Component Value Date    HGBA1C 6.2 11/03/2023     Most recent fingerstick glucose reviewed- No results for input(s): "POCTGLUCOSE" in the last 24 hours.  Current correctional scale  Medium  Maintain anti-hyperglycemic dose as follows-   Antihyperglycemics (From admission, onward)    Start     Stop Route Frequency Ordered    11/03/23 2100  insulin detemir U-100 injection 10 Units         -- SubQ Nightly 11/03/23 1942        Hold Oral hypoglycemics while patient is in the hospital.  " Overdose of drug/medicinal substance

## 2024-05-29 NOTE — ED ADULT NURSE NOTE - PAIN RATING/NUMBER SCALE (0-10): ACTIVITY
4
Is This A New Presentation, Or A Follow-Up?: Growth
How Severe Is Your Skin Lesion?: moderate
Has Your Skin Lesion Been Treated?: not been treated

## 2024-09-10 NOTE — ED ADULT NURSE NOTE - CHPI ED NUR SEVERITY2
PAIN SCALE 8 OF 10.
Normal vision: sees adequately in most situations; can see medication labels, newsprint

## 2024-11-19 NOTE — ED PROVIDER NOTE - NS ED MD DISPO DISCHARGE CCDA
Patient/Caregiver provided printed discharge information. Pt very fatigued from not sleeping over night, returned to supine, +R CAM BOOT and pillow elevation, +alarm, CBIR, NAD, pt eating breakfast at end of session, RN aware.

## 2025-04-14 ENCOUNTER — EMERGENCY (EMERGENCY)
Facility: HOSPITAL | Age: 25
LOS: 1 days | End: 2025-04-14
Attending: STUDENT IN AN ORGANIZED HEALTH CARE EDUCATION/TRAINING PROGRAM | Admitting: STUDENT IN AN ORGANIZED HEALTH CARE EDUCATION/TRAINING PROGRAM
Payer: MEDICAID

## 2025-04-14 VITALS
RESPIRATION RATE: 18 BRPM | TEMPERATURE: 98 F | SYSTOLIC BLOOD PRESSURE: 134 MMHG | HEIGHT: 66 IN | WEIGHT: 164.91 LBS | DIASTOLIC BLOOD PRESSURE: 81 MMHG | OXYGEN SATURATION: 95 % | HEART RATE: 103 BPM

## 2025-04-14 LAB
ALBUMIN SERPL ELPH-MCNC: 4.3 G/DL — SIGNIFICANT CHANGE UP (ref 3.3–5)
ALP SERPL-CCNC: 80 U/L — SIGNIFICANT CHANGE UP (ref 40–120)
ALT FLD-CCNC: 30 U/L — SIGNIFICANT CHANGE UP (ref 10–45)
AMPHET UR-MCNC: NEGATIVE — SIGNIFICANT CHANGE UP
ANION GAP SERPL CALC-SCNC: 13 MMOL/L — SIGNIFICANT CHANGE UP (ref 5–17)
APAP SERPL-MCNC: <1 UG/ML — LOW (ref 10–30)
AST SERPL-CCNC: 35 U/L — SIGNIFICANT CHANGE UP (ref 10–40)
BARBITURATES UR SCN-MCNC: NEGATIVE — SIGNIFICANT CHANGE UP
BASOPHILS # BLD AUTO: 0.04 K/UL — SIGNIFICANT CHANGE UP (ref 0–0.2)
BASOPHILS NFR BLD AUTO: 0.3 % — SIGNIFICANT CHANGE UP (ref 0–2)
BENZODIAZ UR-MCNC: NEGATIVE — SIGNIFICANT CHANGE UP
BILIRUB SERPL-MCNC: 3.6 MG/DL — HIGH (ref 0.2–1.2)
BUN SERPL-MCNC: 15 MG/DL — SIGNIFICANT CHANGE UP (ref 7–23)
CALCIUM SERPL-MCNC: 9.4 MG/DL — SIGNIFICANT CHANGE UP (ref 8.4–10.5)
CHLORIDE SERPL-SCNC: 101 MMOL/L — SIGNIFICANT CHANGE UP (ref 96–108)
CO2 SERPL-SCNC: 26 MMOL/L — SIGNIFICANT CHANGE UP (ref 22–31)
COCAINE METAB.OTHER UR-MCNC: POSITIVE
CREAT SERPL-MCNC: 0.98 MG/DL — SIGNIFICANT CHANGE UP (ref 0.5–1.3)
EGFR: 110 ML/MIN/1.73M2 — SIGNIFICANT CHANGE UP
EGFR: 110 ML/MIN/1.73M2 — SIGNIFICANT CHANGE UP
EOSINOPHIL # BLD AUTO: 0.01 K/UL — SIGNIFICANT CHANGE UP (ref 0–0.5)
EOSINOPHIL NFR BLD AUTO: 0.1 % — SIGNIFICANT CHANGE UP (ref 0–6)
ETHANOL SERPL-MCNC: <3 MG/DL — SIGNIFICANT CHANGE UP (ref 0–3)
GLUCOSE SERPL-MCNC: 98 MG/DL — SIGNIFICANT CHANGE UP (ref 70–99)
HCT VFR BLD CALC: 44.7 % — SIGNIFICANT CHANGE UP (ref 39–50)
HGB BLD-MCNC: 15.6 G/DL — SIGNIFICANT CHANGE UP (ref 13–17)
IMM GRANULOCYTES NFR BLD AUTO: 0.3 % — SIGNIFICANT CHANGE UP (ref 0–0.9)
LYMPHOCYTES # BLD AUTO: 15 % — SIGNIFICANT CHANGE UP (ref 13–44)
LYMPHOCYTES # BLD AUTO: 2.03 K/UL — SIGNIFICANT CHANGE UP (ref 1–3.3)
MCHC RBC-ENTMCNC: 30 PG — SIGNIFICANT CHANGE UP (ref 27–34)
MCHC RBC-ENTMCNC: 34.9 G/DL — SIGNIFICANT CHANGE UP (ref 32–36)
MCV RBC AUTO: 86 FL — SIGNIFICANT CHANGE UP (ref 80–100)
METHADONE UR-MCNC: NEGATIVE — SIGNIFICANT CHANGE UP
MONOCYTES # BLD AUTO: 1.2 K/UL — HIGH (ref 0–0.9)
MONOCYTES NFR BLD AUTO: 8.9 % — SIGNIFICANT CHANGE UP (ref 2–14)
NEUTROPHILS # BLD AUTO: 10.2 K/UL — HIGH (ref 1.8–7.4)
NEUTROPHILS NFR BLD AUTO: 75.4 % — SIGNIFICANT CHANGE UP (ref 43–77)
NRBC BLD AUTO-RTO: 0 /100 WBCS — SIGNIFICANT CHANGE UP (ref 0–0)
OPIATES UR-MCNC: NEGATIVE — SIGNIFICANT CHANGE UP
PCP SPEC-MCNC: SIGNIFICANT CHANGE UP
PCP UR-MCNC: NEGATIVE — SIGNIFICANT CHANGE UP
PLATELET # BLD AUTO: 279 K/UL — SIGNIFICANT CHANGE UP (ref 150–400)
POTASSIUM SERPL-MCNC: 3.9 MMOL/L — SIGNIFICANT CHANGE UP (ref 3.5–5.3)
POTASSIUM SERPL-SCNC: 3.9 MMOL/L — SIGNIFICANT CHANGE UP (ref 3.5–5.3)
PROT SERPL-MCNC: 8.1 G/DL — SIGNIFICANT CHANGE UP (ref 6–8.3)
RBC # BLD: 5.2 M/UL — SIGNIFICANT CHANGE UP (ref 4.2–5.8)
RBC # FLD: 13.9 % — SIGNIFICANT CHANGE UP (ref 10.3–14.5)
SALICYLATES SERPL-MCNC: <0.2 MG/DL — LOW (ref 3–30)
SODIUM SERPL-SCNC: 140 MMOL/L — SIGNIFICANT CHANGE UP (ref 135–145)
THC UR QL: NEGATIVE — SIGNIFICANT CHANGE UP
WBC # BLD: 13.52 K/UL — HIGH (ref 3.8–10.5)
WBC # FLD AUTO: 13.52 K/UL — HIGH (ref 3.8–10.5)

## 2025-04-14 PROCEDURE — 85025 COMPLETE CBC W/AUTO DIFF WBC: CPT

## 2025-04-14 PROCEDURE — 99284 EMERGENCY DEPT VISIT MOD MDM: CPT

## 2025-04-14 PROCEDURE — 99285 EMERGENCY DEPT VISIT HI MDM: CPT | Mod: 25

## 2025-04-14 PROCEDURE — 80053 COMPREHEN METABOLIC PANEL: CPT

## 2025-04-14 PROCEDURE — 80307 DRUG TEST PRSMV CHEM ANLYZR: CPT

## 2025-04-14 PROCEDURE — 93005 ELECTROCARDIOGRAM TRACING: CPT

## 2025-04-14 PROCEDURE — 36415 COLL VENOUS BLD VENIPUNCTURE: CPT

## 2025-04-14 PROCEDURE — 93010 ELECTROCARDIOGRAM REPORT: CPT
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
## 2025-04-14 NOTE — ED ADULT NURSE REASSESSMENT NOTE - NS ED NURSE REASSESS COMMENT FT1
Patient received from previous RN. Pt denies SI/HI, and hallucinations at this time. Pt slightly fidgety, making grunting noises, but otherwise cooperative and calm. Denies any discomfort, pain. denies taking anything at a party last night. Pending tele psych c/s and urine. Safety maintained, 1:1 maintained.

## 2025-04-14 NOTE — ED PROVIDER NOTE - CLINICAL SUMMARY MEDICAL DECISION MAKING FREE TEXT BOX
24-year-old male with no reported prior psychiatric history history of opiate use with last evaluation in May 2024 secondary to Percocet and cocaine use presenting to the ED via EMS and PD with reported hallucinations and paranoia.  Patient was reportedly hearing people in his room with PD reported there was no voices around him.  Patient was also hearing his phone ringing when it was not.  Patient reports he was at a party yesterday and used illicit drugs reports using opiates.  Denies any chest pain shortness breath or abdominal pain.  Denies any SI HI or currently hallucinations.  Patient placed on one-to-one, spoke with mother and father on phone with little to no collateral information provided.  Will get EKG labs and psychiatric consult. 24-year-old male with no reported prior psychiatric history history of opiate use with last evaluation in May 2024 secondary to Percocet and cocaine use presenting to the ED via EMS and PD with reported hallucinations and paranoia.  Patient was reportedly hearing people in his room with PD reported there was no voices around him.  Patient was also hearing his phone ringing when it was not.  Patient reports he was at a party yesterday and used illicit drugs reports using opiates.  Denies any chest pain shortness breath or abdominal pain.  Denies any SI HI or currently hallucinations.  Patient placed on one-to-one, spoke with mother and father on phone with little to no collateral information provided.    Patient is okay with blood work and EKG, will reassess for any recurrent symptoms, at this time patient denies any SI HI or hallucinations, parents provided little collateral information on repeat discussion. will signout to oncoming attending 24-year-old male with no reported prior psychiatric history history of opiate use with last evaluation in May 2024 secondary to Percocet and cocaine use presenting to the ED via EMS and PD with reported hallucinations and paranoia.  Patient was reportedly hearing people in his room with PD reported there was no voices around him.  Patient was also hearing his phone ringing when it was not.  Patient reports he was at a party yesterday and used illicit drugs reports using opiates.  Denies any chest pain shortness breath or abdominal pain.  Denies any SI HI or currently hallucinations.  Patient placed on one-to-one, spoke with mother and father on phone with little to no collateral information provided.    Patient is okay with blood work and EKG, will reassess for any recurrent symptoms, at this time patient denies any SI HI or hallucinations, parents provided little collateral information on repeat discussion. will signout to oncoming attending    Rigo - received s/o from Dr Quiñones. Pt with 1:1 bedside. Inititally rest less but now sleeping comfortably. Pts sister came to ED. Pt still drowsy, will continue to monitor and reassess. As per family pt does have ho of frequent drug use including heroin. No dx of psychiatric illnesses but requesting op fu. Will call family when ready for dc 24-year-old male with no reported prior psychiatric history history of opiate use with last evaluation in May 2024 secondary to Percocet and cocaine use presenting to the ED via EMS and PD with reported hallucinations and paranoia.  Patient was reportedly hearing people in his room with PD reported there was no voices around him.  Patient was also hearing his phone ringing when it was not.  Patient reports he was at a party yesterday and used illicit drugs reports using opiates.  Denies any chest pain shortness breath or abdominal pain.  Denies any SI HI or currently hallucinations.  Patient placed on one-to-one, spoke with mother and father on phone with little to no collateral information provided.    Patient is okay with blood work and EKG, will reassess for any recurrent symptoms, at this time patient denies any SI HI or hallucinations, parents provided little collateral information on repeat discussion. will signout to oncoming attending    Rigo - received s/o from Dr Quiñones. Pt with 1:1 bedside. Inititally rest less but now sleeping comfortably. Pts sister came to ED. Pt still drowsy, will continue to monitor and reassess. As per family pt does have ho of frequent drug use including heroin. No dx of psychiatric illnesses but requesting op fu. Will call family when ready for dc  12:10 am pt awake and alert, walking with a steady gait. Denies auditory or visual hallucinations, likely intitally 2/2 drugs. Called pt's family for ride  Patient to be discharged from ED. Any available test results were discussed with patient and/or family. Verbal instructions given, including instructions to return to ED immediately for any new, worsening, or concerning symptoms. Patient endorsed understanding. Written discharge instructions additionally given, including follow-up plan.

## 2025-04-14 NOTE — ED ADULT NURSE NOTE - CHIEF COMPLAINT QUOTE
Pt BIBA and PD with hallucinations and paranoid. PD states pt was saying there is someone in his house. Was also hearing phone ringing. Denies using drugs or alcohol. Denies SI/HI. 1:1 started in triage

## 2025-04-14 NOTE — ED PROVIDER NOTE - NSFOLLOWUPINSTRUCTIONS_ED_ALL_ED_FT
Substance Use Disorder  Substance use disorder happens when a person's repeated use of drugs or alcohol interferes with his or her ability to be productive. This disorder can cause problems with your mental and physical health. It can affect your ability to have healthy relationships, and it can keep you from being able to meet your responsibilities at work, home, or school. It can also lead to addiction.    Using the substance for longer periods of time or at a higher dosage than what is normal or intended.  Having a lasting desire to use the substance.  Being unable to slow down or stop your use of the substance.  Spending an abnormal amount of time seeking the substance, using the substance, or recovering from using the substance.  Craving the substance.    Substance use that:  To ease symptoms and prevent complications during withdrawal.  To treat other mental health issues, such as depression or anxiety.  To block cravings by causing the same effects as the substance.  To block the effects of the substance or replace good sensations with unpleasant ones.    Going to a support group to share your experience with others who are going through the same thing. These groups are an important part of long-term recovery for many people. They include 12-step programs like Alcoholics Anonymous and Narcotics Anonymous.    Recovery can be a long process. Many people who undergo treatment start using the substance again after stopping. This is called a relapse. If you have a relapse, that does not mean that treatment will not work.    Follow these instructions at home:  Take over-the-counter and prescription medicines only as told by your health care provider.  Do not use any drugs or alcohol.  Keep all follow-up visits as told by your health care provider. This is important. This includes continuing to work with therapists, health care providers, and support groups.  Contact a health care provider if:  You cannot take your medicines as told.  Your symptoms get worse.

## 2025-04-14 NOTE — ED ADULT TRIAGE NOTE - CHIEF COMPLAINT QUOTE
symmetric/accessory muscles used/abdominal muscles used symmetric/accessory muscles used/retractions/abdominal muscles used Pt BIBA and PD with hallucinations and paranoid. PD states pt was saying there is someone in his house. Was also hearing phone ringing. Denies using drugs or alcohol. Denies SI/HI. 1:1 started in triage

## 2025-04-14 NOTE — ED PROVIDER NOTE - PATIENT PORTAL LINK FT
You can access the FollowMyHealth Patient Portal offered by [facility] by registering at the following website: [email or web address]. By joining [employer]’s FollowMyHealth portal, you will also be able to view your health information using other applications (apps) compatible with our system.

## 2025-04-15 VITALS
SYSTOLIC BLOOD PRESSURE: 118 MMHG | OXYGEN SATURATION: 96 % | RESPIRATION RATE: 18 BRPM | TEMPERATURE: 97 F | DIASTOLIC BLOOD PRESSURE: 75 MMHG | HEART RATE: 90 BPM

## 2025-05-24 ENCOUNTER — EMERGENCY (EMERGENCY)
Facility: HOSPITAL | Age: 25
LOS: 1 days | End: 2025-05-24
Attending: INTERNAL MEDICINE | Admitting: INTERNAL MEDICINE
Payer: MEDICAID

## 2025-05-24 VITALS
OXYGEN SATURATION: 99 % | RESPIRATION RATE: 18 BRPM | DIASTOLIC BLOOD PRESSURE: 73 MMHG | WEIGHT: 164.91 LBS | HEART RATE: 77 BPM | SYSTOLIC BLOOD PRESSURE: 119 MMHG | TEMPERATURE: 98 F | HEIGHT: 66 IN

## 2025-05-24 PROCEDURE — 99284 EMERGENCY DEPT VISIT MOD MDM: CPT

## 2025-05-24 PROCEDURE — 99283 EMERGENCY DEPT VISIT LOW MDM: CPT

## 2025-05-24 RX ORDER — ERYTHROMYCIN 5 MG/G
1 OINTMENT OPHTHALMIC
Qty: 1 | Refills: 0
Start: 2025-05-24 | End: 2025-06-02

## 2025-05-24 RX ORDER — OFLOXACIN 3 MG/ML
2 SOLUTION OPHTHALMIC ONCE
Refills: 0 | Status: COMPLETED | OUTPATIENT
Start: 2025-05-24 | End: 2025-05-24

## 2025-05-24 RX ORDER — IBUPROFEN 200 MG
1 TABLET ORAL
Qty: 20 | Refills: 0
Start: 2025-05-24 | End: 2025-05-28

## 2025-05-24 RX ORDER — OFLOXACIN 3 MG/ML
1 SOLUTION OPHTHALMIC
Qty: 1 | Refills: 0
Start: 2025-05-24 | End: 2025-06-02

## 2025-05-24 RX ORDER — FLUORESCEIN SODIUM 0.6 MG
2 STRIP OPHTHALMIC (EYE) ONCE
Refills: 0 | Status: COMPLETED | OUTPATIENT
Start: 2025-05-24 | End: 2025-05-24

## 2025-05-24 RX ORDER — IBUPROFEN 200 MG
600 TABLET ORAL ONCE
Refills: 0 | Status: COMPLETED | OUTPATIENT
Start: 2025-05-24 | End: 2025-05-24

## 2025-05-24 RX ORDER — TETRACAINE HYDROCHLORIDE 5 MG/ML
1 SOLUTION OPHTHALMIC ONCE
Refills: 0 | Status: COMPLETED | OUTPATIENT
Start: 2025-05-24 | End: 2025-05-24

## 2025-05-24 RX ADMIN — OFLOXACIN 2 DROP(S): 3 SOLUTION OPHTHALMIC at 19:08

## 2025-05-24 RX ADMIN — Medication 2 APPLICATION(S): at 18:27

## 2025-05-24 RX ADMIN — TETRACAINE HYDROCHLORIDE 1 DROP(S): 5 SOLUTION OPHTHALMIC at 18:27

## 2025-05-24 RX ADMIN — Medication 600 MILLIGRAM(S): at 19:08

## 2025-05-24 NOTE — ED PROVIDER NOTE - OBJECTIVE STATEMENT
24-year-old male who wears contact lenses stated that he woke up with red eyes stopped using the contact lens since then is burning red denies any rubbing the eyes Patient states he may have slept with the contact lenses on

## 2025-05-24 NOTE — ED PROVIDER NOTE - PHYSICAL EXAMINATION
I general:     NAD, well-nourished, well-appearing  Head:     NC/AT, EOMI, oral mucosa moist  HEENT–pupils equal round reactive to light extraocular muscles intact bilateral significantly erythematous conjunctiva no fluorescein uptake on the cornea  Neck:     trachea midline  Lungs:     CTA b/l, no w/r/r  CVS:     S1S2, RRR, no m/g/r  Abd:     +BS, s/nt/nd, no organomegaly  Ext:    2+ radial and pedal pulses, no c/c/e  Neuro: AAOx3, no sensory/motor deficits

## 2025-05-24 NOTE — ED PROVIDER NOTE - PATIENT PORTAL LINK FT
You can access the FollowMyHealth Patient Portal offered by [facility] by registering at the following website: [email or web address]. By joining [employer]’s FollowMyHealth portal, you will also be able to view your health information using other applications (apps) compatible with our system.

## 2025-05-24 NOTE — ED PROVIDER NOTE - CLINICAL SUMMARY MEDICAL DECISION MAKING FREE TEXT BOX
24-year-old male who wears contact lenses stated that he woke up with red eyes stopped using the contact lens since then is burning red denies any rubbing the eyes Patient states he may have slept with the contact lenses on  Patient given ofloxacin eyedrops plan to discharge the patient with ofloxacin eyedrops and erythromycin at night and Motrin for pain

## 2025-05-24 NOTE — ED PROVIDER NOTE - CARE PROVIDER_API CALL
Tyson Lopez  Ophthalmology  [address] [place] [id number]  Phone: [phone]  Fax: [phone]  Follow Up Time:

## 2025-05-24 NOTE — ED PROVIDER NOTE - NSFOLLOWUPINSTRUCTIONS_ED_ALL_ED_FT
Follow up with your PMD within 1-2 days.  Rest, increase your fluids, advance your activity as tolerated.   Take all of your other medications as previously prescribed.   Worsening, continued or ANY new concerning symptoms return to the emergency department.  Ofloxacin drops 1 drop both eyes 4-6 times a day and erythromycin eye ointment at nighttime Motrin for painFollow-up with eye doctor in 2 to 3 days

## 2025-06-13 NOTE — ED ADULT NURSE NOTE - TEMPLATE
Symptoms
Bed/Stretcher in lowest position, wheels locked, appropriate side rails in place/Call bell, personal items and telephone in reach/Instruct patient to call for assistance before getting out of bed/chair/stretcher/Non-slip footwear applied when patient is off stretcher/[place] to call system/Physically safe environment - no spills, clutter or unnecessary equipment/Purposeful proactive rounding/Room/bathroom lighting operational, light cord in reach